# Patient Record
Sex: FEMALE
[De-identification: names, ages, dates, MRNs, and addresses within clinical notes are randomized per-mention and may not be internally consistent; named-entity substitution may affect disease eponyms.]

---

## 2017-07-19 ENCOUNTER — HOSPITAL ENCOUNTER (EMERGENCY)
Dept: HOSPITAL 42 - ED | Age: 27
Discharge: HOME | End: 2017-07-19
Payer: COMMERCIAL

## 2017-07-19 VITALS — BODY MASS INDEX: 22.2 KG/M2

## 2017-07-19 VITALS
DIASTOLIC BLOOD PRESSURE: 82 MMHG | RESPIRATION RATE: 19 BRPM | TEMPERATURE: 98.6 F | HEART RATE: 78 BPM | OXYGEN SATURATION: 100 % | SYSTOLIC BLOOD PRESSURE: 115 MMHG

## 2017-07-19 DIAGNOSIS — R25.2: Primary | ICD-10-CM

## 2017-07-19 NOTE — RAD
HISTORY:

back pain







COMPARISON:

No prior.



FINDINGS:



BONES:

Reversed S-shaped thoracolumbar scoliosis. No fracture. .  No lytic 

lesions 



DISC SPACES:

Normal.



SOFT TISSUES:

Normal.



OTHER FINDINGS:

None.



IMPRESSION:

Thoracolumbar scoliosis.  No fracture or lytic lesion

## 2017-07-19 NOTE — RAD
PROCEDURE:  Radiographs of the Lumbar Spine.



HISTORY:

back pain







COMPARISON:

No prior.



FINDINGS:



BONES:

Rightward lumbar convexity. No listhesis. No fracture.



DISC SPACES:

Unremarkable.



OTHER FINDINGS:

The pelvic surgical clips.  Left hemipelvic 2 mm phleboliths. 

Extensive stool retention 



IMPRESSION:

No fracture or subluxation.  Rightward lumbar convexity position on 

an or scoliosis.



Extensive stool retention-.

## 2017-07-19 NOTE — RAD
PROCEDURE:  Cervical Spine Radiographs.



HISTORY:

Pain. 



COMPARISON:

None. 



FINDINGS:



BONES:

Mild reversed cervical lordosis. No fracture.  Dens Intact. 



DISC SPACES:

Normal. 



SOFT TISSUES:

Normal. No prevertebral soft tissue swelling. 



OTHER FINDINGS:

None.



IMPRESSION:

Mild reversed cervical lordosis

## 2017-07-19 NOTE — ED PDOC
Arrival/HPI





<Enrike Hawley - Last Filed: 07/19/17 14:31>





<GeniaNerissa - Last Filed: 07/19/17 14:37>





- General


Chief Complaint: Back Pain


Time Seen by Provider: 07/19/17 12:49





- History of Present Illness


Narrative History of Present Illness (Text): 





07/19/17 13:13


This patient is a 28yo F w/ no PMhx, previous 2 C-Sections, who is coming into 

the hospital for intense cervical and lumbar back pain. She states that she is 

a  in [facility] and has to lift very heavy boxes for 10-12 hours 

a day and has been extremely understaffed recently. States she has taken 

nothing for the pain. States the pain shoots down both of her arms from her neck

, and she is unable to flex her neck down without extreme pain. Pain also 

shoots down lumbar spine bilaterally to the knees, not to the feet. Is able to 

ambulate and carry out daily activities (ie job) but with significant 

discomfort. She denies any saddle parasthesia, loss of control of bladder/

bowels. She denies fevers/chills, HA, CP, SOB, abdominal pain, n/V/D, dysuria/

freq/urg, or lower extremity swelling. 





PMD: Dr. Rafia Guadarrama


Allergies: PCN


Surgeries: C-Sections


FamHx: Denies


Meds: Denies


Social: working, denies EtOH, current smoking (former smoker), or illicit drug 

use


 (Enrike Hawley)





Past Medical History





- Provider Review


Nursing Documentation Reviewed: Yes





- Travel History


Have you recently traveled outside [place] w/in the past 3 mons?: No





- Past History


Past History: No Previous





- Infectious Disease


Hx of Infectious Diseases: None





- Tetanus Immunization


Tetanus Immunization: Unknown





- Cardiac


Hx Cardiac Disorders: No





- Pulmonary


Hx Respiratory Disorders: No





- Neurological


Hx Neurological Disorder: No





- HEENT


Hx HEENT Disorder: No





- Renal


Hx Renal Disorder: No





- Endocrine/Metabolic


Hx Endocrine Disorders: No





- Hematological/Oncological


Hx Blood Disorders: No





- Integumentary


Hx Dermatological Disorder: No





- Musculoskeletal/Rheumatological


Hx Musculoskeletal Disorders: Yes


Hx Back Pain: Yes


Other/Comment: b/l KNEE PAIN





- Gastrointestinal


Hx Gastrointestinal Disorders: Yes


Hx Gastroesophageal Reflux: Yes





- Genitourinary/Gynecological


Hx Genitourinary Disorders: No





- Psychiatric


Hx Psychophysiologic Disorder: No


Hx Substance Use: No





- Surgical History


Hx Appendectomy: Yes





- Anesthesia


Hx Anesthesia: Yes


Hx Anesthesia Reactions: No


Hx Malignant Hyperthermia: No





- Suicidal Assessment


Feels Threatened In Home Enviroment: No





<HONORIOTamikocarolynEnrike - Last Filed: 07/19/17 14:31>





Family/Social History





- Physician Review


Nursing Documentation Reviewed: Yes


Family/Social History: No Known Family HX


Smoking Status: Current Some Days Smoker


Hx Alcohol Use: Yes


Frequency of alcohol use: Socially


Hx Substance Use: No





<Enrike Hawley - Last Filed: 07/19/17 14:31>





Allergies/Home Meds





<Enrike Hawley - Last Filed: 07/19/17 14:31>





<Nerissa Cormier - Last Filed: 07/19/17 14:37>


Allergies/Adverse Reactions: 


Allergies





Penicillins Allergy (Verified 07/19/17 12:53)


 ANAPHYLAXIS











Review of Systems





- Review of Systems


Constitutional: absent: Fatigue, Weight Change


Eyes: absent: Vision Changes, Photophobia


ENT: absent: Hearing Changes


Respiratory: absent: SOB, Cough


Cardiovascular: absent: Chest Pain


Gastrointestinal: absent: Abdominal Pain, Stool Changes, Constipation, Diarrhea

, Nausea, Vomiting, Appetite Changes, Hematochezia


Genitourinary Female: absent: Dysuria, Frequency, Hematuria


Musculoskeletal: Back Pain, Neck Pain.  absent: Joint Swelling, Myalgias


Skin: absent: Rash, Pruritis


Neurological: absent: Headache, Dizziness


Endocrine: absent: Diaphoresis, Polyuria


Hemo/Lymphatic: absent: Adenopathy


Psychiatric: absent: Anxiety, Depression





<Enrike Hawley - Last Filed: 07/19/17 14:31>





Physical Exam


Temperature: Afebrile


Blood Pressure: Normal


Pulse: Regular


Respiratory Rate: Normal


Appearance: Positive for: Well-Appearing


Pain Distress: Mild


Mental Status: Positive for: Alert and Oriented X 3





- Systems Exam


Head: Present: Atraumatic, Normocephalic


Pupils: Present: PERRL


Extroacular Muscles: Present: EOMI


Conjunctiva: Present: Normal


Mouth: Present: Moist Mucous Membranes


Neck: Present: Normal Range of Motion, Other (ROM in cervical spine decreased, 

Spurling + b/l on both sides, intact muscle strength).  No: Meningeal Signs


Respiratory/Chest: Present: Clear to Auscultation, Good Air Exchange.  No: 

Respiratory Distress


Cardiovascular: Present: Regular Rate and Rhythm, Normal S1, S2.  No: Murmurs


Abdomen: Present: Normal Bowel Sounds.  No: Tenderness, Distention


Upper Extremity: Present: Normal Inspection, NORMAL PULSES.  No: Cyanosis, Edema

, Normal ROM (ROM decreased due to pain, intact muscle strength b/l on both 

sides, good  strength, intact AIN and PIN), Tenderness, Swelling


Neurological: Present: GCS=15, CN II-XII Intact, Speech Normal, Gait Normal


Skin: Present: Warm


Psychiatric: Present: Alert, Oriented x 3





<Enrike Hawley - Last Filed: 07/19/17 14:31>


Vital Signs Reviewed: Yes





<Nerissa Cormier - Last Filed: 07/19/17 14:37>


Vital Signs











  Temp Pulse Resp BP Pulse Ox


 


 07/19/17 12:56  98.6 F  78  19  115/82  100














Medical Decision Making





<Enrike Hawley - Last Filed: 07/19/17 14:31>





<Nerissa Cormier - Last Filed: 07/19/17 14:37>


ED Course and Treatment: 





07/19/17 13:18


Preg test done in ER; negative


Ordered Cervical, Thoracic and Lumbar X-Rays; no previous imaging on file and 

has been here for back pain in the past


requesting pill instead of toradol; giving 10mg Baclofen and 800mg of Ibuprofen





Disposition and reassess 





07/19/17 14:15


X-Rays show no signs of fracture, good disc space, lumbar and cervical spine 

show mild scoliosis, cervical spine flattening consistent with muscle spasm 


The patient is stable for discharge





Dr. Jim Epperson physiatry; patient given this doctors information for follow up


Naproxen 500mg BID, Baclofen 10mg TID, and Tramadol 50mg BID number 10; told to 

only take tramadol if pain is completely unbearable 


case discussed with Dr. Cormier 


07/19/17 14:33


 (Enrike Hawley)








07/19/17 14:36


Patient seen and examined.  X-ray as noted are unremarkable.  Will d/c on meds 

as noted. (Nerissa Cormier)





- Lab Interpretations


Lab Results: 


 Lab Results





07/19/17 13:19: Urine HCG, Qual Negative











- RAD Interpretation


Radiology Orders: 








07/19/17 13:10


CERVICAL SPINE AP & LATERAL [RAD] Stat 


DORSAL (THORACIC) SPINE [RAD] Stat 





07/19/17 13:12


LS SPINE AP/LAT [RAD] Stat 














- Medication Orders


Current Medication Orders: 











Discontinued Medications





Baclofen (Lioresal)  10 mg PO ONCE STA


   Stop: 07/19/17 13:10


   Last Admin: 07/19/17 14:06  Dose: 10 mg





Ibuprofen (Motrin Tab)  800 mg PO STAT STA


   Stop: 07/19/17 13:10


   Last Admin: 07/19/17 13:19  Dose: 800 mg











- PA / NP / Resident Statement


MD/ has reviewed & agrees with the documentation as recorded.


MD/ has examined the patient and agrees with the treatment plan.





<Nerissa Cormier - Last Filed: 07/19/17 14:37>





Disposition/Present on Arrival





- Present on Arrival


Any Indicators Present on Arrival: No


History of DVT/PE: No


History of Uncontrolled Diabetes: No


Urinary Catheter: No


History of Decub. Ulcer: No


History Surgical Site Infection Following: None





- Disposition


Have Diagnosis and Disposition been Completed?: Yes


Disposition Time: 14:19


Patient Plan: Discharge





<Enrike Hawley - Last Filed: 07/19/17 14:31>





<Nerissa Cormier - Last Filed: 07/19/17 14:37>





- Disposition


Diagnosis: 


 Muscle spasm





Disposition: HOME/ ROUTINE


Patient Problems: 


 Current Active Problems











Problem Status Onset


 


Muscle spasm Acute  











Condition: FAIR


Additional Instructions: 


Take the naprosyn and baclofen as prescribed and tramadol if pain is severe and 

uncontrolled.  Follow up with physiatry.  Return to the emergency department if 

any new concerning symptoms.


Prescriptions: 


Baclofen [Lioresal] 10 mg PO TID PRN #30 tab


 PRN Reason: Muscle Spasm


Naproxen 500 mg PO BID PRN #30 tab


 PRN Reason: Pain, Mild (1-3)


traMADol [Ultram] 50 mg PO BID PRN #10 tab


 PRN Reason: Pain, Moderate (4-7)


Referrals: 


Rafia Guadarrama MD [Primary Care Provider] - Follow up with primary


Art Epperson MD [Staff Provider] - Follow up with primary

## 2017-09-15 ENCOUNTER — HOSPITAL ENCOUNTER (EMERGENCY)
Dept: HOSPITAL 42 - ED | Age: 27
Discharge: HOME | End: 2017-09-15
Payer: COMMERCIAL

## 2017-09-15 VITALS — SYSTOLIC BLOOD PRESSURE: 119 MMHG | HEART RATE: 71 BPM | DIASTOLIC BLOOD PRESSURE: 71 MMHG

## 2017-09-15 VITALS — TEMPERATURE: 99.2 F

## 2017-09-15 VITALS — BODY MASS INDEX: 22.1 KG/M2

## 2017-09-15 VITALS — RESPIRATION RATE: 18 BRPM

## 2017-09-15 VITALS — OXYGEN SATURATION: 100 %

## 2017-09-15 DIAGNOSIS — M54.9: ICD-10-CM

## 2017-09-15 DIAGNOSIS — N83.202: Primary | ICD-10-CM

## 2017-09-15 DIAGNOSIS — R10.9: ICD-10-CM

## 2017-09-15 LAB
ALBUMIN/GLOB SERPL: 1.5 {RATIO} (ref 1.1–1.8)
ALP SERPL-CCNC: 76 U/L (ref 38–126)
ALT SERPL-CCNC: 30 U/L (ref 7–56)
APPEARANCE UR: CLEAR
AST SERPL-CCNC: 29 U/L (ref 14–36)
BACTERIA #/AREA URNS HPF: (no result) /[HPF]
BASE EXCESS BLDV CALC-SCNC: 0.4 MMOL/L (ref 0–2)
BASOPHILS # BLD AUTO: 0.01 K/MM3 (ref 0–2)
BASOPHILS NFR BLD: 0.1 % (ref 0–3)
BILIRUB SERPL-MCNC: 1.4 MG/DL (ref 0.2–1.3)
BILIRUB UR-MCNC: NEGATIVE MG/DL
BUN SERPL-MCNC: 16 MG/DL (ref 7–21)
CALCIUM SERPL-MCNC: 9.8 MG/DL (ref 8.4–10.5)
CHLORIDE SERPL-SCNC: 102 MMOL/L (ref 98–107)
CO2 SERPL-SCNC: 22 MMOL/L (ref 21–33)
COLOR UR: YELLOW
EOSINOPHIL # BLD: 0.1 10*3/UL (ref 0–0.7)
EOSINOPHIL NFR BLD: 1.5 % (ref 1.5–5)
ERYTHROCYTE [DISTWIDTH] IN BLOOD BY AUTOMATED COUNT: 12.7 % (ref 11.5–14.5)
GLOBULIN SER-MCNC: 3.2 GM/DL
GLUCOSE SERPL-MCNC: 97 MG/DL (ref 70–110)
GLUCOSE UR STRIP-MCNC: NEGATIVE MG/DL
GRANULOCYTES # BLD: 6.68 10*3/UL (ref 1.4–6.5)
GRANULOCYTES NFR BLD: 70.2 % (ref 50–68)
HCT VFR BLD CALC: 43.8 % (ref 36–48)
KETONES UR STRIP-MCNC: 15 MG/DL
LEUKOCYTE ESTERASE UR-ACNC: NEGATIVE LEU/UL
LIPASE SERPL-CCNC: 48 U/L (ref 23–300)
LYMPHOCYTES # BLD: 2.1 10*3/UL (ref 1.2–3.4)
LYMPHOCYTES NFR BLD AUTO: 22.2 % (ref 22–35)
MCH RBC QN AUTO: 30.1 PG (ref 25–35)
MCHC RBC AUTO-ENTMCNC: 35.4 G/DL (ref 31–37)
MCV RBC AUTO: 85 FL (ref 80–105)
MONOCYTES # BLD AUTO: 0.6 10*3/UL (ref 0.1–0.6)
MONOCYTES NFR BLD: 6 % (ref 1–6)
PH BLDV: 7.48 [PH] (ref 7.32–7.43)
PH UR STRIP: 8.5 [PH] (ref 4.7–8)
PLATELET # BLD: 323 10^3/UL (ref 120–450)
PMV BLD AUTO: 9.5 FL (ref 7–11)
POTASSIUM SERPL-SCNC: 3.5 MMOL/L (ref 3.6–5)
PROT SERPL-MCNC: 8 G/DL (ref 5.8–8.3)
PROT UR STRIP-MCNC: 30 MG/DL
RBC # UR STRIP: (no result) /UL
RBC #/AREA URNS HPF: (no result) /HPF (ref 0–2)
SODIUM SERPL-SCNC: 140 MMOL/L (ref 132–148)
SP GR UR STRIP: 1.02 (ref 1–1.03)
UROBILINOGEN UR STRIP-ACNC: 4 E.U./DL
WBC # BLD AUTO: 9.5 10^3/UL (ref 4.5–11)
WBC #/AREA URNS HPF: (no result) /HPF (ref 0–6)

## 2017-09-15 PROCEDURE — 96375 TX/PRO/DX INJ NEW DRUG ADDON: CPT

## 2017-09-15 PROCEDURE — 76830 TRANSVAGINAL US NON-OB: CPT

## 2017-09-15 PROCEDURE — 96374 THER/PROPH/DIAG INJ IV PUSH: CPT

## 2017-09-15 PROCEDURE — 99285 EMERGENCY DEPT VISIT HI MDM: CPT

## 2017-09-15 PROCEDURE — 83690 ASSAY OF LIPASE: CPT

## 2017-09-15 PROCEDURE — 82803 BLOOD GASES ANY COMBINATION: CPT

## 2017-09-15 PROCEDURE — 71010: CPT

## 2017-09-15 PROCEDURE — 81001 URINALYSIS AUTO W/SCOPE: CPT

## 2017-09-15 PROCEDURE — 93005 ELECTROCARDIOGRAM TRACING: CPT

## 2017-09-15 PROCEDURE — 80053 COMPREHEN METABOLIC PANEL: CPT

## 2017-09-15 PROCEDURE — 85025 COMPLETE CBC W/AUTO DIFF WBC: CPT

## 2017-09-15 PROCEDURE — 74177 CT ABD & PELVIS W/CONTRAST: CPT

## 2017-09-15 PROCEDURE — 96361 HYDRATE IV INFUSION ADD-ON: CPT

## 2017-09-15 NOTE — CT
PROCEDURE:  CT Abdomen and Pelvis with contrast



HISTORY:

abd pain



COMPARISON:

None.



TECHNIQUE:

Contrast dose: 100 CC OF OMNIPAQUE 350



Radiation dose:



Total exam DLP = 276 mGy-cm.



This CT exam was performed using one or more of the following dose 

reduction techniques: Automated exposure control, adjustment of the 

mA and/or kV according to patient size, and/or use of iterative 

reconstruction technique.



FINDINGS:



LOWER THORAX:

Unremarkable. 



LIVER:

Unremarkable. No gross lesion or ductal dilatation. 



GALLBLADDER AND BILE DUCTS:

Unremarkable. 



PANCREAS:

Unremarkable. No gross lesion or ductal dilatation.



SPLEEN:

Unremarkable. 



ADRENALS:

Unremarkable. No mass. 



KIDNEYS AND URETERS:

Unremarkable. No hydronephrosis. No solid mass. 



VASCULATURE:

Unremarkable. No aortic aneurysm. 



BOWEL:

Unremarkable. No obstruction. No gross mural thickening. 



APPENDIX:

Normal appendix. 



PERITONEUM:

Unremarkable. No free fluid. No free air. 



LYMPH NODES:

Unremarkable. No enlarged lymph nodes. 



BLADDER:

Unremarkable. 



REPRODUCTIVE:

2 centimeter left ovarian cyst.. 



BONES:

No acute fracture. 



OTHER FINDINGS:

None.



IMPRESSION:

2 centimeter left ovarian cyst.

## 2017-09-15 NOTE — ED PDOC
Arrival/HPI





- General


Chief Complaint: Abdominal Pain


Time Seen by Provider: 09/15/17 13:02


Historian: Patient





- History of Present Illness


Narrative History of Present Illness (Text): 





09/15/17 14:03


27yr old female presents today with abdominal pain, back pain, nausea and 

vomiting. pt states nausea and vomiting started first last night and then 

patient developed lower abdominal pain. pt states pain is now worsening and 

radiating to right flank. denies diarrhea. c/o severe sharp constant pain in 

entire abdomen and back. denies fever/chills. no other complaints. 


Time/Duration: Other (1 day)


Symptom Onset: Gradual


Symptom Course: Worsening


Quality: Aching, Stabbing


Severity Level: 10





Past Medical History





- Provider Review


Nursing Documentation Reviewed: Yes





- Travel History


Have you recently traveled outside [place] w/in the past 3 mons?: No





- Past History


Past History: No Previous





- Infectious Disease


Hx of Infectious Diseases: None





- Tetanus Immunization


Tetanus Immunization: Unknown





- Cardiac


Hx Cardiac Disorders: No





- Pulmonary


Hx Respiratory Disorders: No


Hx Asthma: Yes





- Neurological


Hx Neurological Disorder: No





- HEENT


Hx HEENT Disorder: No





- Renal


Hx Renal Disorder: No





- Endocrine/Metabolic


Hx Endocrine Disorders: No





- Hematological/Oncological


Hx Blood Disorders: No





- Integumentary


Hx Dermatological Disorder: No





- Musculoskeletal/Rheumatological


Hx Musculoskeletal Disorders: Yes


Hx Back Pain: Yes


Other/Comment: b/l KNEE PAIN





- Gastrointestinal


Hx Gastrointestinal Disorders: Yes


Hx Gastroesophageal Reflux: Yes





- Genitourinary/Gynecological


Hx Genitourinary Disorders: No





- Psychiatric


Hx Psychophysiologic Disorder: No


Hx Substance Use: No





- Surgical History


Hx Appendectomy: Yes





- Anesthesia


Hx Anesthesia: Yes


Hx Anesthesia Reactions: No


Hx Malignant Hyperthermia: No





- Suicidal Assessment


Feels Threatened In Home Enviroment: No





Family/Social History





- Physician Review


Nursing Documentation Reviewed: Yes


Family/Social History: Unknown Family HX


Smoking Status: Current Some Days Smoker


Hx Alcohol Use: Yes


Hx Substance Use: No





Allergies/Home Meds


Allergies/Adverse Reactions: 


Allergies





Penicillins Allergy (Verified 07/19/17 12:53)


 ANAPHYLAXIS








Home Medications: 


 Home Meds











 Medication  Instructions  Recorded  Confirmed


 


Albuterol HFA [Ventolin HFA 90 2 inhaler INH PRN PRN 09/15/17 09/15/17





mcg/actuation (8 g)]   














Review of Systems





- Review of Systems


Constitutional: absent: Fatigue, Fevers


ENT: absent: Sore Throat


Respiratory: absent: SOB, Cough


Cardiovascular: absent: Chest Pain


Gastrointestinal: Abdominal Pain, Nausea, Vomiting.  absent: Diarrhea


Genitourinary Female: absent: Dysuria, Frequency, Hematuria, Vaginal Bleeding, 

Vaginal Discharge


Musculoskeletal: Back Pain.  absent: Arthralgias, Neck Pain


Skin: absent: Rash, Pruritis


Neurological: absent: Headache, Dizziness


Psychiatric: absent: Anxiety, Depression





Physical Exam


Vital Signs Reviewed: Yes


Vital Signs











  Temp Pulse Resp BP Pulse Ox


 


 09/15/17 19:09   71  18  119/71  100


 


 09/15/17 16:45   75  18  114/79  100


 


 09/15/17 15:29   86  18  116/84  100


 


 09/15/17 14:50   96 H  17  118/80  100


 


 09/15/17 12:51  99.2 F  98 H  18  116/84  99











Temperature: Afebrile


Blood Pressure: Normal


Pulse: Regular


Respiratory Rate: Normal


Appearance: Positive for: Well-Appearing, Non-Toxic, Uncomfortable


Pain Distress: Mild


Mental Status: Positive for: Alert and Oriented X 3





- Systems Exam


Head: Present: Atraumatic


Mouth: Present: Moist Mucous Membranes


Neck: Present: Normal Range of Motion


Respiratory/Chest: Present: Clear to Auscultation, Good Air Exchange.  No: 

Respiratory Distress, Accessory Muscle Use


Cardiovascular: Present: Tachycardic


Abdomen: Present: Tenderness (diffuse abdominal tenderness), Normal Bowel Sounds

, Guarding.  No: Distention, Peritoneal Signs


Back: Present: Normal Inspection, Paraspinal Tenderness (right sided paraspinal 

tenderness).  No: Midline Tenderness


Upper Extremity: Present: Normal ROM


Lower Extremity: Present: Normal ROM


Neurological: Present: GCS=15, Speech Normal


Skin: Present: Warm, Dry, Normal Color.  No: Rashes


Psychiatric: Present: Alert, Oriented x 3





Medical Decision Making


ED Course and Treatment: 





09/15/17 14:07


Patient is nontoxic well appearing with stable vital signs presenting with 

severe abdominal pain





CBC wnl


CMP wnl


Lipase wnl





Lactate; 1.4





Urinalysis: trace ketones





Ultrasound:FINDINGS:


UTERUS:


Measures 4.2 x 5.8 x 8 cm. Normal in size and appearance. No fibroid or other 

mass lesion seen.


ENDOMETRIUM:


Measures 3.0 mm in diameter. No ultrasound findings to suggest gestational sac, 

fluid, debris, mass or polyp or other pathologic process within the 

endometrium. 


CERVIX:


No cervical abnormality identified.


RIGHT OVARY:


Measures 1.7 x 2.6 x 2.9 cm. No solid mass. Normal flow. 


LEFT OVARY:


Measures 2.5 x 2.4 x 3.1 cm. No solid mass. Normal flow. Simple cyst 1.5 x 2.1 

cm.


FREE FLUID:


No significant free fluid noted.


OTHER FINDINGS:


None. 


IMPRESSION:


Simple cyst left adnexa, otherwise unremarkable study.








CAT scanFINDINGS:


LOWER THORAX:


Unremarkable. 


LIVER:


Unremarkable. No gross lesion or ductal dilatation. 


GALLBLADDER AND BILE DUCTS:


Unremarkable. 


PANCREAS:


Unremarkable. No gross lesion or ductal dilatation.


SPLEEN:


Unremarkable. 


ADRENALS:


Unremarkable. No mass. 


KIDNEYS AND URETERS:


Unremarkable. No hydronephrosis. No solid mass. 


VASCULATURE:


Unremarkable. No aortic aneurysm. 


BOWEL:


Unremarkable. No obstruction. No gross mural thickening. 


APPENDIX:


Normal appendix. 


PERITONEUM:


Unremarkable. No free fluid. No free air. 


LYMPH NODES:


Unremarkable. No enlarged lymph nodes. 


BLADDER:


Unremarkable. 


REPRODUCTIVE:


2 centimeter left ovarian cyst.. 


BONES:


No acute fracture. 


OTHER FINDINGS:


None.


IMPRESSION:


2 centimeter left ovarian cyst.











Patient reassessment: pt feeling better after medications; 











Discussed all results with patient in depth. advised f/u with pmd, GI and GYN 

within the next 2 days. advised immediate return if symptoms worsen,persist or 

if new symptoms develop.





Patient verbalizes understanding of discharge instructions and need for 

immediate followup.








all aspects of this case were discussed the attending of record. 











Impression: Abdominal pain, back pain, ovarian cyst


Motrin every 6 hours as needed for pain


Increase fluids


Follow-up the primary care physician within the next 2 days


Follow-up with a GI specialist within the next 2 days


Return immediately if symptoms worsen persist or if new concerning symptoms 

develop








- Lab Interpretations


Lab Results: 








 09/15/17 13:30 





 09/15/17 13:30 





 Lab Results





09/15/17 14:25: pO2 29 L, VBG pH 7.48 H, VBG pCO2 31.0 L, VBG HCO3 23.1, VBG 

Total CO2 24.1, VBG O2 Sat (Calc) 65.9 H, VBG Base Excess 0.4, VBG Potassium 4.4

, Glucose 94, Lactate 1.4, FiO2 21.0, Sodium 134.0, Chloride 104.0, Venous 

Blood Potassium 4.4


09/15/17 13:41: Urine Color Yellow, Urine Appearance Clear, Urine pH 8.5, Ur 

Specific Gravity 1.020, Urine Protein 30 H, Urine Glucose (UA) Negative, Urine 

Ketones 15 H, Urine Blood Trace-intact H, Urine Nitrate Negative, Urine 

Bilirubin Negative, Urine Urobilinogen 4.0 H, Ur Leukocyte Esterase Negative, 

Urine RBC 0 - 2, Urine WBC 0 - 2, Ur Epithelial Cells 4 - 5, Urine Bacteria 

Small


09/15/17 13:30: WBC 9.5  D, RBC 5.15, Hgb 15.5, Hct 43.8, MCV 85.0, MCH 30.1, 

MCHC 35.4, RDW 12.7, Plt Count 323, MPV 9.5, Gran % 70.2 H, Lymph % (Auto) 22.2

, Mono % (Auto) 6.0, Eos % (Auto) 1.5, Baso % (Auto) 0.1, Gran # 6.68 H, Lymph 

# 2.1, Mono # 0.6, Eos # 0.1, Baso # 0.01


09/15/17 13:30: Sodium 140, Potassium 3.5 L, Chloride 102, Carbon Dioxide 22, 

Anion Gap 20, BUN 16, Creatinine 0.8, Est GFR (African Amer) > 60, Est GFR (Non-

Af Amer) > 60, Random Glucose 97, Calcium 9.8, Total Bilirubin 1.4 H, AST 29, 

ALT 30, Alkaline Phosphatase 76, Total Protein 8.0, Albumin 4.8, Globulin 3.2, 

Albumin/Globulin Ratio 1.5, Lipase 48











- RAD Interpretation


Radiology Orders: 








09/15/17 13:37


CHEST PORTABLE [RAD] Stat 





09/15/17 13:47


ABD & PELVIS IV CONTRAST ONLY [CT] Stat 





09/15/17 17:46


TRANSVAGINAL [US] Stat 














- Medication Orders


Current Medication Orders: 











Discontinued Medications





Sodium Chloride (Sodium Chloride 0.9%)  1,000 mls @ 999 mls/hr IV .Q1H1M STA


   Stop: 09/15/17 14:18


   Last Admin: 09/15/17 14:01  Dose: 999 mls/hr





Iohexol (Omnipaque 300 100 Ml) Confirm Administered Dose 100 ml IJ .STK-MED ONE


   Stop: 09/15/17 14:48


Iohexol (Omnipaque 350 100 Ml) Confirm Administered Dose 350 mg .ROUTE .STK-MED 

ONE


   Stop: 09/15/17 14:59


Ketorolac Tromethamine (Toradol)  30 mg IVP STAT STA


   Stop: 09/15/17 13:38


   Last Admin: 09/15/17 13:52  Dose: 30 mg





Morphine Sulfate (Morphine)  4 mg IVP STAT STA


   Stop: 09/15/17 14:57


   Last Admin: 09/15/17 15:35  Dose: 4 mg





Ondansetron HCl (Zofran Inj)  4 mg IVP STAT STA


   Stop: 09/15/17 13:38


   Last Admin: 09/15/17 13:51  Dose: 4 mg











Disposition/Present on Arrival





- Present on Arrival


Any Indicators Present on Arrival: No


History of DVT/PE: No


History of Uncontrolled Diabetes: No


Urinary Catheter: No


History of Decub. Ulcer: No


History Surgical Site Infection Following: None





- Disposition


Have Diagnosis and Disposition been Completed?: Yes


Diagnosis: 


 Abdominal pain, Back pain, Ovarian cyst





Disposition: HOME/ ROUTINE


Disposition Time: 19:35


Patient Plan: Discharge


Condition: GOOD


Discharge Instructions (ExitCare):  Ovarian Cyst (ED), Acute Abdominal Pain (ED)

, Back Pain (ED)


Additional Instructions: 


Motrin every 6 hours as needed for pain


Increase fluids


Follow-up the primary care physician within the next 2 days


Follow-up with a GI specialist within the next 2 days


Return immediately if symptoms worsen persist or if new concerning symptoms 

develop


Prescriptions: 


Ibuprofen [Motrin] 600 mg PO Q6H PRN #20 tab


 PRN Reason: pain/fever reduction


Referrals: 


Rafia Guadarrama MD [Primary Care Provider] - Follow up with primary


Archie Rea [Medical Doctor] - Follow up with primary


Katelyn Gordon MD, MD [Medical Doctor] - Follow up with primary


Forms:  [employer] (English)

## 2017-09-15 NOTE — US
HISTORY:

Pelvic pain.  Currently on Depo-Provera 



COMPARISON:

None available.



TECHNIQUE:

Transvaginal only. Real -time technique with 2D, duplex and color 

Doppler



FINDINGS:



UTERUS:

Measures 4.2 x 5.8 x 8 cm. Normal in size and appearance. No fibroid 

or other mass lesion seen.



ENDOMETRIUM:

Measures 3.0 mm in diameter. No ultrasound findings to suggest 

gestational sac, fluid, debris, mass or polyp or other pathologic 

process within the endometrium. 



CERVIX:

No cervical abnormality identified.



RIGHT OVARY:

Measures 1.7 x 2.6 x 2.9 cm. No solid mass. Normal flow. 



LEFT OVARY:

Measures 2.5 x 2.4 x 3.1 cm. No solid mass. Normal flow. Simple cyst 

1.5 x 2.1 cm.



FREE FLUID:

No significant free fluid noted.



OTHER FINDINGS:

None. 



IMPRESSION:

Simple cyst left adnexa, otherwise unremarkable study.

## 2017-09-15 NOTE — RAD
HISTORY:

abd pain  



COMPARISON:

No prior. 



FINDINGS:



LUNGS:

No active pulmonary disease.



PLEURA:

No significant pleural effusion identified, no pneumothorax apparent.



CARDIOVASCULAR:

Normal.



OSSEOUS STRUCTURES:

No significant abnormalities.



VISUALIZED UPPER ABDOMEN:

Normal.



OTHER FINDINGS:

None.



IMPRESSION:

No active disease.

## 2017-09-16 NOTE — CARD
--------------- APPROVED REPORT --------------





EKG Measurement

Heart [id number]

[id number]

[id number]

[id number]

[id number]



<Conclusion>

Sinus rhythm with marked sinus arrhythmia

Otherwise normal ECG

## 2017-11-01 ENCOUNTER — HOSPITAL ENCOUNTER (EMERGENCY)
Dept: HOSPITAL 42 - ED | Age: 27
Discharge: HOME | End: 2017-11-01
Payer: COMMERCIAL

## 2017-11-01 VITALS — SYSTOLIC BLOOD PRESSURE: 126 MMHG | HEART RATE: 88 BPM | DIASTOLIC BLOOD PRESSURE: 76 MMHG | TEMPERATURE: 98.9 F

## 2017-11-01 VITALS — OXYGEN SATURATION: 98 % | RESPIRATION RATE: 20 BRPM

## 2017-11-01 VITALS — BODY MASS INDEX: 22.7 KG/M2

## 2017-11-01 DIAGNOSIS — F17.200: ICD-10-CM

## 2017-11-01 DIAGNOSIS — J45.901: Primary | ICD-10-CM

## 2017-11-01 DIAGNOSIS — Z88.0: ICD-10-CM

## 2017-11-01 DIAGNOSIS — J02.9: ICD-10-CM

## 2017-11-01 LAB
APPEARANCE UR: (no result)
BILIRUB UR-MCNC: NEGATIVE MG/DL
COLOR UR: YELLOW
GLUCOSE UR STRIP-MCNC: NEGATIVE MG/DL
KETONES UR STRIP-MCNC: NEGATIVE MG/DL
LEUKOCYTE ESTERASE UR-ACNC: NEGATIVE LEU/UL
PH UR STRIP: 8.5 [PH] (ref 4.7–8)
PROT UR STRIP-MCNC: NEGATIVE MG/DL
RBC # UR STRIP: NEGATIVE /UL
SP GR UR STRIP: 1.01 (ref 1–1.03)
UROBILINOGEN UR STRIP-ACNC: 0.2 E.U./DL

## 2017-11-01 NOTE — RAD
HISTORY:

cough/fever  



COMPARISON:

No prior.



TECHNIQUE:

Chest PA and lateral



FINDINGS:



LUNGS:

No active pulmonary disease.



PLEURA:

No significant pleural effusion identified. No pneumothorax apparent.



CARDIOVASCULAR:

Normal.



OSSEOUS STRUCTURES:

No significant abnormalities.



VISUALIZED UPPER ABDOMEN:

Normal.



OTHER FINDINGS:

None.



IMPRESSION:

No active disease.

## 2017-11-01 NOTE — ED PDOC
Arrival/HPI





- General


Time Seen by Provider: 11/01/17 11:13


Historian: Patient





- History of Present Illness


Narrative History of Present Illness (Text): 


11/01/17 11:14


A 27 year old female, whose past medical history includes asthma, presents to 

the emergency department complaining of productive cough for the past 5 days. 

Patient notes fatigue, nasal congestion and post-tussive chest discomfort. She 

reports a fever of 101 at home yesterday. pt states she has cough with green 

phelgm. pt c/o hearing rattling sounds with deep inspiration. pt c/o feeling as 

if she has a bladder infection. Patient denies any nausea, vomiting, abdominal 

pain, headache, dizziness or any other complaints. 





Time/Duration: Other (5 days)


Symptom Course: Unchanged


Quality: Other


Context: Home





Past Medical History





- Provider Review


Nursing Documentation Reviewed: Yes





- Past History


Past History: No Previous





- Infectious Disease


Hx of Infectious Diseases: None





- Tetanus Immunization


Tetanus Immunization: Unknown





- Cardiac


Hx Cardiac Disorders: No





- Pulmonary


Hx Respiratory Disorders: No


Hx Asthma: Yes





- Neurological


Hx Neurological Disorder: No





- HEENT


Hx HEENT Disorder: No





- Renal


Hx Renal Disorder: No





- Endocrine/Metabolic


Hx Endocrine Disorders: No





- Hematological/Oncological


Hx Blood Disorders: No





- Integumentary


Hx Dermatological Disorder: No





- Musculoskeletal/Rheumatological


Hx Musculoskeletal Disorders: Yes


Hx Back Pain: Yes





- Gastrointestinal


Hx Gastrointestinal Disorders: Yes


Hx Gastroesophageal Reflux: Yes





- Genitourinary/Gynecological


Hx Genitourinary Disorders: No





- Psychiatric


Hx Psychophysiologic Disorder: No


Hx Substance Use: No





- Surgical History


Hx Appendectomy: Yes





- Anesthesia


Hx Anesthesia: Yes


Hx Anesthesia Reactions: No


Hx Malignant Hyperthermia: No





- Suicidal Assessment


Feels Threatened In Home Enviroment: No





Family/Social History





- Physician Review


Nursing Documentation Reviewed: Yes


Family/Social History: No Known Family HX


Smoking Status: Current Some Days Smoker


Hx Alcohol Use: Yes


Hx Substance Use: No





Allergies/Home Meds


Allergies/Adverse Reactions: 


Allergies





Penicillins Allergy (Verified 07/19/17 12:53)


 ANAPHYLAXIS








Home Medications: 


 Home Meds











 Medication  Instructions  Recorded  Confirmed


 


RX: Albuterol HFA [Ventolin HFA 90 2 inhaler INH PRN PRN 09/15/17 11/01/17





mcg/actuation (8 g)]   














Review of Systems





- Physician Review


All systems were reviewed & negative as marked: Yes





- Review of Systems


Constitutional: Fatigue, Fevers


ENT: Sore Throat, Sinus Congestion


Respiratory: Cough, Sputum, Wheezing.  absent: SOB


Cardiovascular: Chest Pain (post-tussive).  absent: Palpitations


Gastrointestinal: absent: Abdominal Pain, Constipation, Diarrhea, Nausea, 

Vomiting, Appetite Changes


Genitourinary Female: Dysuria, Frequency.  absent: Hematuria


Musculoskeletal: absent: Arthralgias, Back Pain, Neck Pain


Skin: absent: Rash, Pruritis


Neurological: absent: Headache, Dizziness


Psychiatric: absent: Anxiety, Depression





Physical Exam


Vital Signs Reviewed: Yes


Vital Signs











  Temp Pulse Resp BP Pulse Ox


 


 11/01/17 11:25    20  


 


 11/01/17 11:05  99 F  95 H  20  131/73  98











Temperature: Afebrile


Blood Pressure: Normal


Pulse: Regular


Respiratory Rate: Normal


Appearance: Positive for: Well-Appearing, Non-Toxic, Comfortable


Pain Distress: None


Mental Status: Positive for: Alert and Oriented X 3





- Systems Exam


Head: Present: Atraumatic, Normocephalic


Conjunctiva: Present: Normal


Ears: Present: Normal, NORMAL TM, Normal Canal.  No: Erythema, TM Bulging, TM 

Perf


Mouth: Present: Moist Mucous Membranes, Normal Lips, Normal Tounge.  No: 

Drooling, Trismus


Pharnyx: Present: Normal.  No: ERYTHEMA, EXUDATE, TONSILS ENLARGED


Nose (External): Present: Atraumatic


Nose (Internal): Present: Engorged, Clear Mucous, Other (congestion).  No: No 

Active Bleeding, Septal Hematoma


Neck: Present: Normal Range of Motion, Trachea Midline.  No: Meningeal Signs, 

Lymphadenopathy


Respiratory/Chest: Present: Good Air Exchange, Wheezes (expiratory wheezing 

bilaterally), Rhonchi.  No: Respiratory Distress, Accessory Muscle Use


Cardiovascular: Present: Regular Rate and Rhythm, Normal S1, S2.  No: Murmurs


Abdomen: Present: Normal Bowel Sounds.  No: Tenderness, Distention, Peritoneal 

Signs


Back: Present: Normal Inspection.  No: CVA Tenderness


Upper Extremity: Present: Normal Inspection.  No: Cyanosis, Edema


Lower Extremity: Present: Normal Inspection.  No: Edema


Neurological: Present: GCS=15, Speech Normal, Gait Normal


Skin: Present: Warm, Dry, Normal Color.  No: Rashes


Psychiatric: Present: Alert, Oriented x 3





Medical Decision Making


ED Course and Treatment: 


11/01/17 11:14


A 27 year old female with a productive cough and congestion





Plan:


-- Chest xray: no infiltrate. reviewed by dr. Jensen.


-- Duoneb


-- ekg; normal sinus rhythm at 82 bpm normal axis normal intervals no ST 

elevations


-- ua: wnl


-- Reassess and disposition





Progress Notes:





11/01/17 12:42


pt reassessment; lungs CTA bilaterally. No wheezing rales or rhonchi.





Patient with a history of asthma will start the patient on prednisone


Zithromax given by mouth. 








discussed all results in depth with patient. 


Patient was advised to follow up with primary care physician within the next 2 

days. Patient was advised to use albuterol nebulizer as needed. Patient was 

advised take medications as prescribed and return immediately if symptoms 

worsen persist or if new concerning symptoms develop








Patient verbalizes understanding of discharge instructions and need for 

immediate followup.








all aspects of this case were discussed the attending of record.  











impression; cough, sore throat, asthma exacerbation


Motrin every 6 hours as needed for pain/fever reduction


Zithromax daily 4 days


Prednisone daily 4 days


Increase fluids


Follow-up with primary care physician within the next 2 days


Return immediately if symptoms worsen persist or if new concerning symptoms 

develop 





- Lab Interpretations


Lab Results: 


 Lab Results





11/01/17 11:40: Urine Color Yellow, Urine Appearance Sl cloudy, Urine pH 8.5, 

Ur Specific Gravity 1.015, Urine Protein Negative, Urine Glucose (UA) Negative, 

Urine Ketones Negative, Urine Blood Negative, Urine Nitrate Negative, Urine 

Bilirubin Negative, Urine Urobilinogen 0.2, Ur Leukocyte Esterase Negative











- RAD Interpretation


Radiology Orders: 








11/01/17 11:14


CHEST TWO VIEWS (PA/LAT) [RAD] Stat 














- Medication Orders


Current Medication Orders: 








Azithromycin (Zithromax)  500 mg PO STAT STA


   PRN Reason: Protocol


   Stop: 11/01/17 12:39


Prednisone (Prednisone Tab)  60 mg PO STAT ONE


   Stop: 11/01/17 12:39





Discontinued Medications





Albuterol/Ipratropium (Duoneb 3 Mg/0.5 Mg (3 Ml) Ud)  3 ml IH STAT STA


   Stop: 11/01/17 11:15


   Last Admin: 11/01/17 11:24  Dose: 3 ml











- Scribe Statement


The provider has reviewed the documentation as recorded by the Willieibglen Mason





Provider Scribe Attestation:


All medical record entries made by the Scribe were at my direction and 

personally dictated by me. I have reviewed the chart and agree that the record 

accurately reflects my personal performance of the history, physical exam, 

medical decision making, and the department course for this patient. I have 

also personally directed, reviewed, and agree with the discharge instructions 

and disposition.








Disposition/Present on Arrival





- Present on Arrival


Any Indicators Present on Arrival: No


History of DVT/PE: No


History of Uncontrolled Diabetes: No


Urinary Catheter: No


History of Decub. Ulcer: No


History Surgical Site Infection Following: None





- Disposition


Have Diagnosis and Disposition been Completed?: Yes


Diagnosis: 


 Asthma exacerbation, Cough, Sore throat





Disposition: HOME/ ROUTINE


Disposition Time: 12:45


Patient Plan: Discharge


Condition: GOOD


Discharge Instructions (ExitCare):  Asthma (ED), Acute Cough (ED)


Additional Instructions: 


Motrin every 6 hours as needed for pain/fever reduction


Zithromax daily 4 days


Prednisone daily 4 days


Use nebulizer 3 times daily as needed for cough


Increase fluids


Follow-up with primary care physician within the next 2 days


Return immediately if symptoms worsen persist or if new concerning symptoms 

develop 


Prescriptions: 


RX: Albuterol HFA [Ventolin HFA 90 mcg/actuation (8 g)] 2 puff IH [id number] PRN #

1 inhaler


 PRN Reason: Cough


RX: Albuterol 0.083% [Albuterol 0.083% Inhal Sol (2.5 mg/3 ml) UD] 1 vial IH 

TID PRN #1 packet


 PRN Reason: Cough


Azithromycin [Zithromax] 250 mg PO DAILY #4 tab


Ibuprofen [Motrin] 600 mg PO Q6H PRN #20 tab


 PRN Reason: pain/fever reduction


RX: predniSONE [predniSONE Tab] 3 tab PO DAILY #12 tab


Referrals: 


Devin Vieira MD [Staff Provider] - Follow up with primary


[facility]Outside] - Follow up with primary


Forms:  WORK NOTE

## 2017-11-01 NOTE — CARD
--------------- APPROVED REPORT --------------





EKG Measurement

Heart [id number]

VT [id number]

[id number]

[id number]

[id number]



<Conclusion>

Normal sinus rhythm

Lateral ST elevation, Consider early repolarization pattern

Borderline ECG

## 2018-07-02 ENCOUNTER — HOSPITAL ENCOUNTER (EMERGENCY)
Dept: HOSPITAL 42 - ED | Age: 28
Discharge: HOME | End: 2018-07-02
Payer: COMMERCIAL

## 2018-07-02 VITALS
HEART RATE: 85 BPM | RESPIRATION RATE: 19 BRPM | OXYGEN SATURATION: 99 % | SYSTOLIC BLOOD PRESSURE: 125 MMHG | TEMPERATURE: 98.6 F | DIASTOLIC BLOOD PRESSURE: 63 MMHG

## 2018-07-02 VITALS — BODY MASS INDEX: 22.7 KG/M2

## 2018-07-02 DIAGNOSIS — T65.891A: ICD-10-CM

## 2018-07-02 DIAGNOSIS — T24.201A: ICD-10-CM

## 2018-07-02 DIAGNOSIS — T24.211A: Primary | ICD-10-CM
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
## 2018-07-02 DIAGNOSIS — T24.601A: ICD-10-CM

## 2018-07-02 DIAGNOSIS — Z23: ICD-10-CM

## 2018-07-02 DIAGNOSIS — T24.611A: ICD-10-CM
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
## 2018-09-13 ENCOUNTER — HOSPITAL ENCOUNTER (EMERGENCY)
Dept: HOSPITAL 42 - ED | Age: 28
Discharge: HOME | End: 2018-09-13
Payer: COMMERCIAL

## 2018-09-13 VITALS — SYSTOLIC BLOOD PRESSURE: 115 MMHG | OXYGEN SATURATION: 99 % | DIASTOLIC BLOOD PRESSURE: 81 MMHG

## 2018-09-13 VITALS — BODY MASS INDEX: 21.2 KG/M2

## 2018-09-13 VITALS — HEART RATE: 75 BPM | RESPIRATION RATE: 18 BRPM | TEMPERATURE: 98.5 F

## 2018-09-13 DIAGNOSIS — K21.9: ICD-10-CM

## 2018-09-13 DIAGNOSIS — E86.0: Primary | ICD-10-CM

## 2018-09-13 LAB
ALBUMIN SERPL-MCNC: 4.5 G/DL (ref 3–4.8)
ALBUMIN/GLOB SERPL: 1.5 {RATIO} (ref 1.1–1.8)
ALT SERPL-CCNC: 22 U/L (ref 7–56)
AMORPH SED URNS QL MICRO: (no result)
APPEARANCE UR: CLEAR
AST SERPL-CCNC: 22 U/L (ref 14–36)
BACTERIA #/AREA URNS HPF: (no result) /[HPF]
BASOPHILS # BLD AUTO: 0.03 K/MM3 (ref 0–2)
BASOPHILS NFR BLD: 0.5 % (ref 0–3)
BILIRUB UR-MCNC: NEGATIVE MG/DL
BUN SERPL-MCNC: 20 MG/DL (ref 7–21)
CALCIUM SERPL-MCNC: 9.3 MG/DL (ref 8.4–10.5)
COLOR UR: YELLOW
EOSINOPHIL # BLD: 0.2 10*3/UL (ref 0–0.7)
EOSINOPHIL NFR BLD: 2.5 % (ref 1.5–5)
ERYTHROCYTE [DISTWIDTH] IN BLOOD BY AUTOMATED COUNT: 12.8 % (ref 11.5–14.5)
GFR NON-AFRICAN AMERICAN: > 60
GLUCOSE UR STRIP-MCNC: NEGATIVE MG/DL
GRANULOCYTES # BLD: 2.91 10*3/UL (ref 1.4–6.5)
GRANULOCYTES NFR BLD: 45.4 % (ref 50–68)
HGB BLD-MCNC: 13.4 G/DL (ref 12–16)
LEUKOCYTE ESTERASE UR-ACNC: NEGATIVE LEU/UL
LYMPHOCYTES # BLD: 2.8 10*3/UL (ref 1.2–3.4)
LYMPHOCYTES NFR BLD AUTO: 44 % (ref 22–35)
MCH RBC QN AUTO: 29.9 PG (ref 25–35)
MCHC RBC AUTO-ENTMCNC: 34.3 G/DL (ref 31–37)
MCV RBC AUTO: 87.3 FL (ref 80–105)
MONOCYTES # BLD AUTO: 0.5 10*3/UL (ref 0.1–0.6)
MONOCYTES NFR BLD: 7.6 % (ref 1–6)
PH UR STRIP: 7 [PH] (ref 4.7–8)
PLATELET # BLD: 291 10^3/UL (ref 120–450)
PMV BLD AUTO: 9.7 FL (ref 7–11)
PROT UR STRIP-MCNC: (no result) MG/DL
RBC # BLD AUTO: 4.48 10^6/UL (ref 3.5–6.1)
RBC # UR STRIP: NEGATIVE /UL
RBC #/AREA URNS HPF: (no result) /HPF (ref 0–2)
SP GR UR STRIP: 1.01 (ref 1–1.03)
UROBILINOGEN UR STRIP-ACNC: 1 E.U./DL
WBC # BLD AUTO: 6.4 10^3/UL (ref 4.5–11)

## 2018-09-13 PROCEDURE — 81001 URINALYSIS AUTO W/SCOPE: CPT

## 2018-09-13 PROCEDURE — 99284 EMERGENCY DEPT VISIT MOD MDM: CPT

## 2018-09-13 PROCEDURE — 96360 HYDRATION IV INFUSION INIT: CPT

## 2018-09-13 PROCEDURE — 85025 COMPLETE CBC W/AUTO DIFF WBC: CPT

## 2018-09-13 PROCEDURE — 87086 URINE CULTURE/COLONY COUNT: CPT

## 2018-09-13 PROCEDURE — 84702 CHORIONIC GONADOTROPIN TEST: CPT

## 2018-09-13 PROCEDURE — 82948 REAGENT STRIP/BLOOD GLUCOSE: CPT

## 2018-09-13 PROCEDURE — 80053 COMPREHEN METABOLIC PANEL: CPT

## 2018-09-13 NOTE — ED PDOC
Arrival/HPI





- General


Historian: Patient





- History of Present Illness


Time/Duration: < week (3 days)


Symptom Course: Worsening





<Monica Cifuentes - Last Filed: 18 14:03>





<Suhas Escobar - Last Filed: 18 14:06>





- General


Chief Complaint: Abdominal Pain


Time Seen by Provider: 18 11:18





- History of Present Illness


Narrative History of Present Illness (Text): 





18 13:44


27 y/o A1 female with PMH of GERD and asthma who presents to the ED c/o 

nausea and vomiting x 3 days. Pt has been unable to keep much food or water down

, vomiting approx 3 times daily. Pt also having intermittent mild suprapubic 

cramping that began with the vomiting. Associated symptoms include dry mouth, 

constipation, fatigue, urinary frequency, and breast tenderness. Pt suspects 

she might be pregnant, unsure of home pregnancy test results. LMP 08/15/18. Pt 

seen at [facility] 3 weeks ago for vomiting and diagnosed with low blood sugar. 

Currently uses tobacco and marijuana intermittently. Denies fever, chills, 

diarrhea, dysuria, hematuria, vaginal bleeding, vaginal discharge, SOB, 

rhinorrhea, sore throat, headache, dizziness, syncope. 





 (Monica Cifuentes)





Past Medical History





- Provider Review


Nursing Documentation Reviewed: Yes





- Past History


Past History: No Previous





- Infectious Disease


Hx of Infectious Diseases: None





- Tetanus Immunization


Tetanus Immunization: Unknown





- Reproductive


Currently Pregnant: Unknown





- Cardiac


Hx Cardiac Disorders: No





- Pulmonary


Hx Respiratory Disorders: Yes


Hx Asthma: Yes





- Neurological


Hx Neurological Disorder: No





- HEENT


Hx HEENT Disorder: No





- Renal


Hx Renal Disorder: No





- Endocrine/Metabolic


Hx Endocrine Disorders: No





- Hematological/Oncological


Hx Blood Disorders: No





- Integumentary


Hx Dermatological Disorder: No





- Musculoskeletal/Rheumatological


Hx Musculoskeletal Disorders: Yes


Hx Back Pain: Yes





- Gastrointestinal


Hx Gastrointestinal Disorders: Yes


Hx Gastroesophageal Reflux: Yes





- Genitourinary/Gynecological


Hx Genitourinary Disorders: No


Other/Comment: spontaneous , 2014





- Psychiatric


Hx Psychophysiologic Disorder: No


Hx Substance Use: Yes





- Surgical History


Hx Appendectomy: Yes





- Anesthesia


Hx Anesthesia: Yes


Hx Anesthesia Reactions: No


Hx Malignant Hyperthermia: No





- Suicidal Assessment


Feels Threatened In Home Enviroment: No





<Monica Cifuentes - Last Filed: 18 14:03>





Family/Social History





- Physician Review


Nursing Documentation Reviewed: Yes


Family/Social History: No Known Family HX


Smoking Status: Current Some Days Smoker


Hx Alcohol Use: Yes


Hx Substance Use: Yes


Substance used: marijuana


Route: Smoking/Inhalation





<Monica Cifuentes - Last Filed: 18 14:03>





Allergies/Home Meds





<Monica Cifuentes - Last Filed: 18 14:03>





<Suhas Escobar - Last Filed: 18 14:06>


Allergies/Adverse Reactions: 


Allergies





Penicillins Allergy (Verified 18 11:15)


 ANAPHYLAXIS








Home Medications: 


 Home Meds











 Medication  Instructions  Recorded  Confirmed


 


No Known Home Med  18














Review of Systems





- Physician Review


All systems were reviewed & negative as marked: Yes





- Review of Systems


Constitutional: Normal.  absent: Fevers


ENT: Normal.  absent: Sore Throat, Rhinorrhea, Sinus Congestion


Respiratory: Normal.  absent: SOB


Cardiovascular: Normal


Gastrointestinal: Abdominal Pain (Crampy, suprapubic), Constipation, Nausea, 

Vomiting, Anorexia.  absent: Diarrhea, Hematochezia


Genitourinary Female: Frequency.  absent: Dysuria, Hematuria, Vaginal Bleeding, 

Vaginal Discharge


Musculoskeletal: Normal


Skin: Normal


Neurological: Normal


Hemo/Lymphatic: absent: Adenopathy





<Monica Cifuentes - Last Filed: 18 14:03>





Physical Exam


Vital Signs Reviewed: Yes


Temperature: Afebrile


Blood Pressure: Normal


Pulse: Regular


Respiratory Rate: Normal


Appearance: Positive for: Well-Appearing, Non-Toxic, Comfortable


Pain Distress: None


Mental Status: Positive for: Alert and Oriented X 3





- Systems Exam


Conjunctiva: Present: Normal


Ears: Present: Normal


Mouth: Present: Dry, Normal Tounge, Normal Teeth.  No: Normal Lips (Dry, chapped

)


Pharnyx: Present: Normal.  No: ERYTHEMA, EXUDATE, TONSILS ENLARGED, 

Peritonsilar Swelling


Neck: Present: Normal Range of Motion


Respiratory/Chest: Present: Clear to Auscultation, Good Air Exchange.  No: 

Respiratory Distress, Accessory Muscle Use


Cardiovascular: Present: Regular Rate and Rhythm, Normal S1, S2, Peripheal 

Pulses Present.  No: Murmurs


Abdomen: Present: Tenderness (Mild tenderness, suprapubic), Normal Bowel 

Sounds.  No: Distention, Peritoneal Signs


Upper Extremity: Present: NORMAL PULSES, Capillary Refill < 2s


Lower Extremity: Present: NORMAL PULSES, Capillary Refill < 2 s


Neurological: Present: Gait Normal


Skin: Present: Warm, Dry, Normal Color.  No: Rashes


Lymphatic: No: Cervical Adenopathy


Psychiatric: Present: Alert, Oriented x 3, Normal Insight, Normal Concentration





<Monica Cifuentes - Last Filed: 18 14:03>





Vital Signs











  Temp Pulse Resp BP Pulse Ox


 


 18 11:01  98.5 F  75  18  112/79  100














Medical Decision Making


Re-evaluation Time: 13:00


Reassessment Condition: Improved





- Lab Interpretations


I have reviewed the lab results: Yes





<Monica Cifuentes - Last Filed: 18 14:03>





<Suhas Escobar - Last Filed: 18 14:06>


ED Course and Treatment: 


18 12:00


27 y/o A1 female with PMH of GERD and asthma who presents to the ED c/o 

nausea and vomiting x 3 days. Associated crampy suprapubic pain. Denies fevers, 

chills, diarrhea, SOB, rhinorrhea, sore throat, headache. Physical exam shows 

mild suprapubic tenderness and dry mouth/lips. 


Urine pregnancy test negative but has history of false negative pregnancy 

tests. 





Will order CBC, CMP, UA with culture, beta-hcg quant


Will give fluids





Case discussed with Dr. Escobar, who agrees with plan. 





18 13:00


Pt reassessed. 


Pt states she is feeling better with fluids. Denies nausea.





CBC: wnl


CMP: wnl


UA: trace protein attributed to dehydration, otherwise wnl


beta-hcg quant: slightly elevated, possible early pregnancy








18 13:27


Pt informed of lab results.


Positive beta-hcg quantitative result discussed. Pt advised that this could be 

early pregnancy. 








18 13:36





Impression: Dehydration





Plan:


Pt advised to stop all alcohol, tobacco, and drug use and to begin taking OTC 

prenatal gummy vitamins. 


Pt will followup with OBGYN in two days to repeat bloodwork. Lab results 

printed for pt to take home. 


Pt will followup with PMD as needed


Told to return for high fevers, intractable vomiting, severe abdominal pain, 

syncope


Pt understands and agrees with plan, feels comfortable with discharge home.


 (Monica Cifuentes)





- Lab Interpretations


Lab Results: 











 18 12:00 





 18 12:00 





 Lab Results





18 12:00: Beta HCG, Quant 11.27 H


18 12:00: Sodium 139, Potassium 4.0, Chloride 106, Carbon Dioxide 25, 

Anion Gap 13, BUN 20, Creatinine 0.9, Est GFR (African Amer) > 60, Est GFR (Non-

Af Amer) > 60, Random Glucose 86, Calcium 9.3, Total Bilirubin 0.7, AST 22, ALT 

22, Alkaline Phosphatase 43, Total Protein 7.3, Albumin 4.5, Globulin 2.9, 

Albumin/Globulin Ratio 1.5


18 12:00: Urine Color Yellow, Urine Appearance Clear, Urine pH 7.0, Ur 

Specific Gravity 1.015, Urine Protein Trace H, Urine Glucose (UA) Negative, 

Urine Ketones Negative, Urine Blood Negative, Urine Nitrate Negative, Urine 

Bilirubin Negative, Urine Urobilinogen 1.0 H, Ur Leukocyte Esterase Negative, 

Urine RBC 0 - 2, Urine WBC 1 - 3, Ur Epithelial Cells 10 - 12, Amorphous 

Sediment Few, Urine Bacteria Many, Urine Other Uyeast


18 12:00: WBC 6.4  D, RBC 4.48, Hgb 13.4  D, Hct 39.1, MCV 87.3, MCH 29.9

, MCHC 34.3, RDW 12.8, Plt Count 291, MPV 9.7, Gran % 45.4 L, Lymph % (Auto) 

44.0 H, Mono % (Auto) 7.6 H, Eos % (Auto) 2.5, Baso % (Auto) 0.5, Gran # 2.91, 

Lymph # (Auto) 2.8, Mono # (Auto) 0.5, Eos # (Auto) 0.2, Baso # (Auto) 0.03











- Medication Orders


Current Medication Orders: 














Discontinued Medications





Sodium Chloride (Sodium Chloride 0.9%)  1,000 mls @ 999 mls/hr IV .Q1H1M STA


   Stop: 18 12:55


   Last Admin: 18 12:07  Dose: 999 mls/hr





eMAR Start Stop


 Document     18 12:07  LA  (Rec: 18 12:08  LA  [id number])


     Intravenous Solution


      Start Date                                 18


      Start Time                                 12:07


      End Date                                   18


      End time                                   13:08


      Total Infusion Time                        61














- PA / NP / Resident Statement


MD/ has examined the patient and agrees with the treatment plan.





<Suhas Escobar - Last Filed: 18 14:06>





Disposition/Present on Arrival





- Present on Arrival


Any Indicators Present on Arrival: No


History of DVT/PE: No


History of Uncontrolled Diabetes: No


Urinary Catheter: No


History of Decub. Ulcer: No


History Surgical Site Infection Following: None





- Disposition


Have Diagnosis and Disposition been Completed?: Yes


Disposition Time: 13:00





<Monica Cifuentes - Last Filed: 18 14:03>





<Suhas Escobar - Last Filed: 18 14:06>





- Disposition


Diagnosis: 


 Dehydration





Disposition: HOME/ ROUTINE


Patient Problems: 


 Current Active Problems











Problem Status Onset


 


Dehydration Acute  











Condition: IMPROVED


Discharge Instructions (ExitCare):  Dehydration, Adult (DC)


Additional Instructions: 


Increase clear fluids in order to stay hydrated. 


Stop all tobacco, alcohol, and drug use.


Begin taking OTC Prenatal gummies


Followup with OBGYN in 2 days to repeat bloodwork. 


Followup with PMD as needed.


Return for intractable vomiting, heavy vaginal bleeding, severe abdominal pain


Referrals: 


Solis Esposito MD [Staff Provider] - Follow up with primary


Ashely Will MD [Medical Doctor] - Follow up with primary


 Service [Outside] - Follow up with primary


Forms:  [employer] (English)

## 2018-09-23 ENCOUNTER — HOSPITAL ENCOUNTER (EMERGENCY)
Dept: HOSPITAL 42 - ED | Age: 28
LOS: 1 days | Discharge: HOME | End: 2018-09-24
Payer: COMMERCIAL

## 2018-09-23 VITALS — BODY MASS INDEX: 21.2 KG/M2

## 2018-09-23 VITALS — RESPIRATION RATE: 18 BRPM

## 2018-09-23 DIAGNOSIS — O20.9: Primary | ICD-10-CM

## 2018-09-23 LAB
ALBUMIN SERPL-MCNC: 4.6 G/DL (ref 3–4.8)
ALBUMIN/GLOB SERPL: 1.5 {RATIO} (ref 1.1–1.8)
ALT SERPL-CCNC: 21 U/L (ref 7–56)
AST SERPL-CCNC: 21 U/L (ref 14–36)
BUN SERPL-MCNC: 15 MG/DL (ref 7–21)
CALCIUM SERPL-MCNC: 9.7 MG/DL (ref 8.4–10.5)
ERYTHROCYTE [DISTWIDTH] IN BLOOD BY AUTOMATED COUNT: 12.8 % (ref 11.5–14.5)
GFR NON-AFRICAN AMERICAN: > 60
HGB BLD-MCNC: 13.4 G/DL (ref 12–16)
MCH RBC QN AUTO: 30 PG (ref 25–35)
MCHC RBC AUTO-ENTMCNC: 34.3 G/DL (ref 31–37)
MCV RBC AUTO: 87.5 FL (ref 80–105)
PLATELET # BLD: 317 10^3/UL (ref 120–450)
PMV BLD AUTO: 9.7 FL (ref 7–11)
RBC # BLD AUTO: 4.47 10^6/UL (ref 3.5–6.1)
WBC # BLD AUTO: 8.6 10^3/UL (ref 4.5–11)

## 2018-09-23 NOTE — ED PDOC
Arrival/HPI





- General


Chief Complaint: Female Genitourinary


Time Seen by Provider: 18 21:52


Historian: Patient





- History of Present Illness


Narrative History of Present Illness (Text): 





18 22:20


28 year old female, whose past medical history includes GERD and asthma  

and 1 fetal demise, presents to the emergency department complaining of vaginal 

bleeding with cramping. Patient last menstrual period was the end of August. 

Patient was here last week for stomach virus, but told it was early pregnancy 

with mild elevation of Beta HCG. Patient denies any fever, chills, chest pain, 

shortness of breath, nausea, vomiting, diarrhea, urinary symptoms, back pain, 

neck pain, headache, dizziness, or any other complaints.  





PMD: Dr. Guadarrama








Symptom Onset: Gradual


Symptom Course: Unchanged


Activities at Onset: Light


Context: Home





Past Medical History





- Provider Review


Nursing Documentation Reviewed: Yes





- Past History


Past History: No Previous





- Infectious Disease


Hx of Infectious Diseases: None





- Tetanus Immunization


Tetanus Immunization: Unknown





- Cardiac


Hx Cardiac Disorders: No





- Pulmonary


Hx Respiratory Disorders: Yes


Hx Asthma: Yes





- Neurological


Hx Neurological Disorder: No





- HEENT


Hx HEENT Disorder: No





- Renal


Hx Renal Disorder: No





- Endocrine/Metabolic


Hx Endocrine Disorders: No





- Hematological/Oncological


Hx Blood Disorders: No





- Integumentary


Hx Dermatological Disorder: No





- Musculoskeletal/Rheumatological


Hx Musculoskeletal Disorders: Yes


Hx Back Pain: Yes





- Gastrointestinal


Hx Gastrointestinal Disorders: Yes


Hx Gastroesophageal Reflux: Yes





- Genitourinary/Gynecological


Hx Genitourinary Disorders: No


Other/Comment: spontaneous , 2014





- Psychiatric


Hx Psychophysiologic Disorder: No


Hx Substance Use: Yes





- Surgical History


Hx Appendectomy: Yes





- Anesthesia


Hx Anesthesia: Yes


Hx Anesthesia Reactions: No


Hx Malignant Hyperthermia: No





- Suicidal Assessment


Feels Threatened In Home Enviroment: No





Family/Social History





- Physician Review


Nursing Documentation Reviewed: Yes


Family/Social History: No Known Family HX


Smoking Status: Current Some Days Smoker


Hx Alcohol Use: Yes


Hx Substance Use: Yes


Substance used: marijuana





Allergies/Home Meds


Allergies/Adverse Reactions: 


Allergies





Penicillins Allergy (Verified 18 22:21)


 ANAPHYLAXIS








Home Medications: 


 Home Meds











 Medication  Instructions  Recorded  Confirmed


 


No Known Home Med  18














Review of Systems





- Physician Review


All systems were reviewed & negative as marked: Yes





- Review of Systems


Constitutional: absent: Fevers, Other (Chills)


Respiratory: absent: SOB


Cardiovascular: absent: Chest Pain


Gastrointestinal: Abdominal Pain (cramping).  absent: Diarrhea, Nausea, Vomiting


Genitourinary Female: Vaginal Bleeding.  absent: Dysuria, Frequency, Hematuria


Musculoskeletal: absent: Back Pain, Neck Pain


Neurological: absent: Headache, Dizziness





Physical Exam


Vital Signs Reviewed: Yes


Vital Signs











  Temp Pulse Resp BP Pulse Ox


 


 18 22:20  98.6 F  63  18  101/71  100











Temperature: Afebrile


Blood Pressure: Normal


Pulse: Regular


Respiratory Rate: Normal


Appearance: Positive for: Well-Appearing, Non-Toxic, Comfortable


Pain Distress: None


Mental Status: Positive for: Alert and Oriented X 3





- Systems Exam


Head: Present: Atraumatic, Normocephalic


Pupils: Present: PERRL


Extroacular Muscles: Present: EOMI


Conjunctiva: Present: Normal


Mouth: Present: Moist Mucous Membranes


Neck: Present: Normal Range of Motion


Respiratory/Chest: Present: Clear to Auscultation, Good Air Exchange.  No: 

Respiratory Distress, Accessory Muscle Use


Cardiovascular: Present: Regular Rate and Rhythm, Normal S1, S2.  No: Murmurs


Abdomen: No: Tenderness, Distention, Peritoneal Signs


Genitourinary/Pelvic Exam: Present: Vaginal Bleeding (Scanty amount of blood to 

the vaginal introitus), Cervical os Closed.  No: Cervical Motion Tendernes


Back: Present: Normal Inspection


Upper Extremity: Present: Normal Inspection.  No: Cyanosis, Edema


Lower Extremity: Present: Normal Inspection.  No: Edema


Neurological: Present: GCS=15, CN II-XII Intact, Speech Normal


Skin: Present: Warm, Dry, Normal Color.  No: Rashes


Psychiatric: Present: Alert, Oriented x 3, Normal Insight, Normal Concentration





Medical Decision Making


ED Course and Treatment: 





18 22:20


Impression:


28 year old female presents complaining of vaginal bleeding with cramping. 





Plan:


-- labs


-- Transvaginal US 


-- Reassess and disposition





Prior Visits:


Notes and results from previous visits were reviewed. Patient was last seen in 

the emergency department on 18 presents complaining of nausea and 

vomiting for the past 3 days. Patient was discharged. 





Progress Notes:





EXAM: US Pregnancy, Transvaginal


Dictated and Authenticated by: Truman Sweet MD


2018 11:59 PM 


IMPRESSION:


1. No intrauterine gestational sac is seen. Findings may reflect recent 

spontaneous AB or very early


intrauterine gestation. Ectopic pregnancy is not excluded although no adnexal 

abnormality is seen.


Serial beta hCG levels may be of benefit.


2. Otherwise negative pelvic echogram.





18 01:21


On re-evaluation, patient feels better here in the Emergency room The results 

were discussed with the patient at great length. Results revealed either early 

pregnancy or spontaneous . Patient scheduled to see GYN tomorrow and to 

follow up as instructed. Of course if any heavy bleeding or pain occurs, 

patient was instructed to return to Emergency department. 





- Lab Interpretations


Lab Results: 








 18 22:45 





 18 22:45 





 Lab Results





18 23:53: Blood Type Confirm O POSITIVE


18 23:26: Blood Type O POSITIVE, Antibody Screen Negative, BBK History 

Checked No verified bt


18 22:45: Beta HCG, Quant 35.25 H


18 22:45: WBC 8.6  D, RBC 4.47, Hgb 13.4, Hct 39.1, MCV 87.5, MCH 30.0, 

MCHC 34.3, RDW 12.8, Plt Count 317, MPV 9.7


18 22:45: Sodium 140, Potassium 3.5 L, Chloride 104, Carbon Dioxide 27, 

Anion Gap 12, BUN 15, Creatinine 0.9, Est GFR (African Amer) > 60, Est GFR (Non-

Af Amer) > 60, Random Glucose 94, Calcium 9.7, Total Bilirubin 0.2, AST 21, ALT 

21, Alkaline Phosphatase 56, Total Protein 7.6, Albumin 4.6, Globulin 3.1, 

Albumin/Globulin Ratio 1.5








I have reviewed the lab results: Yes





- RAD Interpretation


Radiology Orders: 








18 22:29


OB TRANSVAGINAL PREGNANCY [US] Stat 














- Scribe Statement


The provider has reviewed the documentation as recorded by the Francesco Anaya





Provider Scribe Attestation:


All medical record entries made by the Scribe were at my direction and 

personally dictated by me. I have reviewed the chart and agree that the record 

accurately reflects my personal performance of the history, physical exam, 

medical decision making, and the department course for this patient. I have 

also personally directed, reviewed, and agree with the discharge instructions 

and disposition.








Disposition/Present on Arrival





- Present on Arrival


Any Indicators Present on Arrival: No


History of DVT/PE: No


History of Uncontrolled Diabetes: No


Urinary Catheter: No


History of Decub. Ulcer: No


History Surgical Site Infection Following: None





- Disposition


Have Diagnosis and Disposition been Completed?: Yes


Diagnosis: 


 Vaginal bleeding affecting early pregnancy





Disposition: HOME/ ROUTINE


Disposition Time: 01:19


Patient Plan: Discharge


Patient Problems: 


 Current Active Problems











Problem Status Onset


 


Vaginal bleeding affecting early pregnancy Acute  











Condition: GOOD


Discharge Instructions (ExitCare):  Bleeding With Pregnancy (DC)


Additional Instructions: 


Rest/no strenuous physical activity/follow up with your gynecologist tommorow 

as scheduled


Referrals: 


Rafia Guadarrama MD [Primary Care Provider] - Follow up with primary


Forms:  [employer] (English)

## 2018-09-24 VITALS
DIASTOLIC BLOOD PRESSURE: 64 MMHG | HEART RATE: 74 BPM | SYSTOLIC BLOOD PRESSURE: 113 MMHG | TEMPERATURE: 98 F | OXYGEN SATURATION: 98 %

## 2018-09-24 NOTE — US
Date of service: 



09/23/2018



HISTORY:

Pain.  LMP 08/15/2018. 



COMPARISON:

None available.



TECHNIQUE:

Transvaginal only. Real -time technique with 2D, duplex and color 

Doppler



FINDINGS:



UTERUS:

Measures 4.8 x 6.3 x 9.1 cm. Normal in size and appearance. No 

fibroid or other mass lesion seen.



ENDOMETRIUM:

Measures 11.3 mm in diameter. No ultrasound findings to suggest 

gestational sac, fluid, debris, mass or polyp or other pathologic 

process within the endometrium. 



CERVIX:

No cervical abnormality identified.



RIGHT OVARY:

Measures 1.5 x 1.7 x 2.9 cm. No solid mass. Normal flow. 



LEFT OVARY:

Measures 2.1 x 3.1 x 2.3 cm. No solid mass. Normal flow. 



FREE FLUID:

No significant free fluid noted.



OTHER FINDINGS:

None. 



IMPRESSION:

Unremarkable pelvic ultrasound.  No visible products of conception 

either intrauterine or ectopic. 



________________________________________________



Concordant results (preliminary interpretation) provided by [facility].



Procedure Completed: 23:20



Preliminary ([facility]) Report: Dictated and Authenticated: 23:59



Final Interpretation: 08:32.  September 24, 2018.

## 2018-10-27 ENCOUNTER — HOSPITAL ENCOUNTER (EMERGENCY)
Dept: HOSPITAL 42 - ED | Age: 28
Discharge: HOME | End: 2018-10-27
Payer: COMMERCIAL

## 2018-10-27 VITALS — BODY MASS INDEX: 21.2 KG/M2

## 2018-10-27 VITALS — SYSTOLIC BLOOD PRESSURE: 126 MMHG | HEART RATE: 80 BPM | RESPIRATION RATE: 18 BRPM | DIASTOLIC BLOOD PRESSURE: 86 MMHG

## 2018-10-27 VITALS — OXYGEN SATURATION: 99 %

## 2018-10-27 VITALS — TEMPERATURE: 98.7 F

## 2018-10-27 DIAGNOSIS — J11.1: Primary | ICD-10-CM

## 2018-10-27 DIAGNOSIS — F17.210: ICD-10-CM

## 2018-10-27 LAB
ALBUMIN SERPL-MCNC: 4.6 G/DL (ref 3–4.8)
ALBUMIN/GLOB SERPL: 1.4 {RATIO} (ref 1.1–1.8)
ALT SERPL-CCNC: 20 U/L (ref 7–56)
AST SERPL-CCNC: 22 U/L (ref 14–36)
BASOPHILS # BLD AUTO: 0.04 K/MM3 (ref 0–2)
BASOPHILS NFR BLD: 0.5 % (ref 0–3)
BUN SERPL-MCNC: 12 MG/DL (ref 7–21)
CALCIUM SERPL-MCNC: 9.5 MG/DL (ref 8.4–10.5)
EOSINOPHIL # BLD: 0.3 10*3/UL (ref 0–0.7)
EOSINOPHIL NFR BLD: 4.1 % (ref 1.5–5)
ERYTHROCYTE [DISTWIDTH] IN BLOOD BY AUTOMATED COUNT: 12.3 % (ref 11.5–14.5)
GFR NON-AFRICAN AMERICAN: > 60
GRANULOCYTES # BLD: 3.43 10*3/UL (ref 1.4–6.5)
GRANULOCYTES NFR BLD: 45.8 % (ref 50–68)
HGB BLD-MCNC: 14.4 G/DL (ref 12–16)
LYMPHOCYTES # BLD: 3.3 10*3/UL (ref 1.2–3.4)
LYMPHOCYTES NFR BLD AUTO: 43.6 % (ref 22–35)
MCH RBC QN AUTO: 30.2 PG (ref 25–35)
MCHC RBC AUTO-ENTMCNC: 34.3 G/DL (ref 31–37)
MCV RBC AUTO: 88.1 FL (ref 80–105)
MONOCYTES # BLD AUTO: 0.5 10*3/UL (ref 0.1–0.6)
MONOCYTES NFR BLD: 6 % (ref 1–6)
PLATELET # BLD: 309 10^3/UL (ref 120–450)
PMV BLD AUTO: 10 FL (ref 7–11)
RBC # BLD AUTO: 4.77 10^6/UL (ref 3.5–6.1)
WBC # BLD AUTO: 7.5 10^3/UL (ref 4.5–11)

## 2018-10-27 PROCEDURE — 71045 X-RAY EXAM CHEST 1 VIEW: CPT

## 2018-10-27 PROCEDURE — 96360 HYDRATION IV INFUSION INIT: CPT

## 2018-10-27 PROCEDURE — 99285 EMERGENCY DEPT VISIT HI MDM: CPT

## 2018-10-27 PROCEDURE — 80053 COMPREHEN METABOLIC PANEL: CPT

## 2018-10-27 PROCEDURE — 85025 COMPLETE CBC W/AUTO DIFF WBC: CPT

## 2018-10-27 PROCEDURE — 87804 INFLUENZA ASSAY W/OPTIC: CPT

## 2018-10-27 NOTE — RAD
Date of service: 



10/27/2018



HISTORY:

 medical clearance 



COMPARISON:

11/01/2027 



FINDINGS:



LUNGS:

The lungs are well inflated and clear.



PLEURA:

No pleural effusions or pneumothorax. 



CARDIOVASCULAR:

The heart is normal in size.  No aortic atherosclerotic calcification 

present. 



OSSEOUS STRUCTURES:

Within normal limits for the patient's age.



VISUALIZED UPPER ABDOMEN:

Normal.



OTHER FINDINGS:

None.



IMPRESSION:

No active pulmonary disease.

## 2019-02-18 ENCOUNTER — EMERGENCY (EMERGENCY)
Facility: HOSPITAL | Age: 29
LOS: 0 days | Discharge: HOME | End: 2019-02-18
Attending: EMERGENCY MEDICINE | Admitting: EMERGENCY MEDICINE

## 2019-02-18 VITALS
OXYGEN SATURATION: 98 % | RESPIRATION RATE: 20 BRPM | HEART RATE: 71 BPM | SYSTOLIC BLOOD PRESSURE: 122 MMHG | TEMPERATURE: 98 F | DIASTOLIC BLOOD PRESSURE: 59 MMHG

## 2019-02-18 DIAGNOSIS — Z90.49 ACQUIRED ABSENCE OF OTHER SPECIFIED PARTS OF DIGESTIVE TRACT: ICD-10-CM

## 2019-02-18 DIAGNOSIS — R10.84 GENERALIZED ABDOMINAL PAIN: ICD-10-CM

## 2019-02-18 DIAGNOSIS — Z88.0 ALLERGY STATUS TO PENICILLIN: ICD-10-CM

## 2019-02-18 DIAGNOSIS — K21.9 GASTRO-ESOPHAGEAL REFLUX DISEASE WITHOUT ESOPHAGITIS: ICD-10-CM

## 2019-02-18 DIAGNOSIS — R11.2 NAUSEA WITH VOMITING, UNSPECIFIED: ICD-10-CM

## 2019-02-18 DIAGNOSIS — Z90.49 ACQUIRED ABSENCE OF OTHER SPECIFIED PARTS OF DIGESTIVE TRACT: Chronic | ICD-10-CM

## 2019-02-18 LAB
ALBUMIN SERPL ELPH-MCNC: 4.5 G/DL — SIGNIFICANT CHANGE UP (ref 3.5–5.2)
ALP SERPL-CCNC: 55 U/L — SIGNIFICANT CHANGE UP (ref 30–115)
ALT FLD-CCNC: 13 U/L — SIGNIFICANT CHANGE UP (ref 0–41)
ANION GAP SERPL CALC-SCNC: 11 MMOL/L — SIGNIFICANT CHANGE UP (ref 7–14)
APPEARANCE UR: CLEAR — SIGNIFICANT CHANGE UP
AST SERPL-CCNC: 15 U/L — SIGNIFICANT CHANGE UP (ref 0–41)
BASOPHILS # BLD AUTO: 0.05 K/UL — SIGNIFICANT CHANGE UP (ref 0–0.2)
BASOPHILS NFR BLD AUTO: 0.7 % — SIGNIFICANT CHANGE UP (ref 0–1)
BILIRUB SERPL-MCNC: 0.4 MG/DL — SIGNIFICANT CHANGE UP (ref 0.2–1.2)
BILIRUB UR-MCNC: NEGATIVE — SIGNIFICANT CHANGE UP
BLD GP AB SCN SERPL QL: SIGNIFICANT CHANGE UP
BUN SERPL-MCNC: 19 MG/DL — SIGNIFICANT CHANGE UP (ref 10–20)
CALCIUM SERPL-MCNC: 9.5 MG/DL — SIGNIFICANT CHANGE UP (ref 8.5–10.1)
CHLORIDE SERPL-SCNC: 104 MMOL/L — SIGNIFICANT CHANGE UP (ref 98–110)
CO2 SERPL-SCNC: 25 MMOL/L — SIGNIFICANT CHANGE UP (ref 17–32)
COLOR SPEC: YELLOW — SIGNIFICANT CHANGE UP
CREAT SERPL-MCNC: 0.9 MG/DL — SIGNIFICANT CHANGE UP (ref 0.7–1.5)
DIFF PNL FLD: NEGATIVE — SIGNIFICANT CHANGE UP
EOSINOPHIL # BLD AUTO: 0.27 K/UL — SIGNIFICANT CHANGE UP (ref 0–0.7)
EOSINOPHIL NFR BLD AUTO: 3.7 % — SIGNIFICANT CHANGE UP (ref 0–8)
GLUCOSE SERPL-MCNC: 89 MG/DL — SIGNIFICANT CHANGE UP (ref 70–99)
GLUCOSE UR QL: NEGATIVE MG/DL — SIGNIFICANT CHANGE UP
HCT VFR BLD CALC: 41 % — SIGNIFICANT CHANGE UP (ref 37–47)
HGB BLD-MCNC: 13.8 G/DL — SIGNIFICANT CHANGE UP (ref 12–16)
IMM GRANULOCYTES NFR BLD AUTO: 0.1 % — SIGNIFICANT CHANGE UP (ref 0.1–0.3)
KETONES UR-MCNC: NEGATIVE — SIGNIFICANT CHANGE UP
LEUKOCYTE ESTERASE UR-ACNC: NEGATIVE — SIGNIFICANT CHANGE UP
LIDOCAIN IGE QN: 59 U/L — SIGNIFICANT CHANGE UP (ref 7–60)
LYMPHOCYTES # BLD AUTO: 3.73 K/UL — HIGH (ref 1.2–3.4)
LYMPHOCYTES # BLD AUTO: 51 % — SIGNIFICANT CHANGE UP (ref 20.5–51.1)
MCHC RBC-ENTMCNC: 29.7 PG — SIGNIFICANT CHANGE UP (ref 27–31)
MCHC RBC-ENTMCNC: 33.7 G/DL — SIGNIFICANT CHANGE UP (ref 32–37)
MCV RBC AUTO: 88.2 FL — SIGNIFICANT CHANGE UP (ref 81–99)
MONOCYTES # BLD AUTO: 0.61 K/UL — HIGH (ref 0.1–0.6)
MONOCYTES NFR BLD AUTO: 8.3 % — SIGNIFICANT CHANGE UP (ref 1.7–9.3)
NEUTROPHILS # BLD AUTO: 2.65 K/UL — SIGNIFICANT CHANGE UP (ref 1.4–6.5)
NEUTROPHILS NFR BLD AUTO: 36.2 % — LOW (ref 42.2–75.2)
NITRITE UR-MCNC: NEGATIVE — SIGNIFICANT CHANGE UP
NRBC # BLD: 0 /100 WBCS — SIGNIFICANT CHANGE UP (ref 0–0)
PH UR: 6 — SIGNIFICANT CHANGE UP (ref 5–8)
PLATELET # BLD AUTO: 310 K/UL — SIGNIFICANT CHANGE UP (ref 130–400)
POTASSIUM SERPL-MCNC: 4.2 MMOL/L — SIGNIFICANT CHANGE UP (ref 3.5–5)
POTASSIUM SERPL-SCNC: 4.2 MMOL/L — SIGNIFICANT CHANGE UP (ref 3.5–5)
PROT SERPL-MCNC: 6.9 G/DL — SIGNIFICANT CHANGE UP (ref 6–8)
PROT UR-MCNC: NEGATIVE MG/DL — SIGNIFICANT CHANGE UP
RBC # BLD: 4.65 M/UL — SIGNIFICANT CHANGE UP (ref 4.2–5.4)
RBC # FLD: 12.1 % — SIGNIFICANT CHANGE UP (ref 11.5–14.5)
SODIUM SERPL-SCNC: 140 MMOL/L — SIGNIFICANT CHANGE UP (ref 135–146)
SP GR SPEC: 1.02 — SIGNIFICANT CHANGE UP (ref 1.01–1.03)
TYPE + AB SCN PNL BLD: SIGNIFICANT CHANGE UP
UROBILINOGEN FLD QL: 1 MG/DL (ref 0.2–0.2)
WBC # BLD: 7.32 K/UL — SIGNIFICANT CHANGE UP (ref 4.8–10.8)
WBC # FLD AUTO: 7.32 K/UL — SIGNIFICANT CHANGE UP (ref 4.8–10.8)

## 2019-02-18 RX ORDER — IOHEXOL 300 MG/ML
30 INJECTION, SOLUTION INTRAVENOUS ONCE
Qty: 0 | Refills: 0 | Status: COMPLETED | OUTPATIENT
Start: 2019-02-18 | End: 2019-02-18

## 2019-02-18 RX ORDER — ONDANSETRON 8 MG/1
4 TABLET, FILM COATED ORAL ONCE
Qty: 0 | Refills: 0 | Status: COMPLETED | OUTPATIENT
Start: 2019-02-18 | End: 2019-02-18

## 2019-02-18 RX ORDER — SODIUM CHLORIDE 9 MG/ML
1000 INJECTION INTRAMUSCULAR; INTRAVENOUS; SUBCUTANEOUS ONCE
Qty: 0 | Refills: 0 | Status: COMPLETED | OUTPATIENT
Start: 2019-02-18 | End: 2019-02-18

## 2019-02-18 RX ADMIN — Medication 30 MILLILITER(S): at 19:24

## 2019-02-18 RX ADMIN — ONDANSETRON 4 MILLIGRAM(S): 8 TABLET, FILM COATED ORAL at 19:29

## 2019-02-18 RX ADMIN — SODIUM CHLORIDE 2000 MILLILITER(S): 9 INJECTION INTRAMUSCULAR; INTRAVENOUS; SUBCUTANEOUS at 19:24

## 2019-02-18 RX ADMIN — IOHEXOL 30 MILLILITER(S): 300 INJECTION, SOLUTION INTRAVENOUS at 19:24

## 2019-02-18 NOTE — ED ADULT NURSE NOTE - NSSISCREENINGQ5_ED_A_ED
Instructions for Birth Control Pill Use      The birth control pill works primarily by blocking ovulation (release of an egg). If there is no egg to meet the sperm, pregnancy cannot occur.   The pill also works by making cervical mucous thick and unrecepti time every day, like brushing your teeth in the morning, eating a meal or going to bed. Establishing a routine will make it easier for you to remember. The pill works best if you take it about the same time every day.     Continuing on the pills ~ What if…… pills at once. If you are in the third week of pills, finish the pack and skip the inactive pills and start a new pack. Start your backup method of birth control immediately.   You are at risk for becoming pregnant if you do not use a backup contraception No

## 2019-02-18 NOTE — ED PROVIDER NOTE - PHYSICAL EXAMINATION
CONSTITUTIONAL: Well-developed; well-nourished; in no acute distress, speaking in full sentences  SKIN: warm, dry  HEAD: Normocephalic; atraumatic  EYES: PERRL, EOMI, no conjunctival erythema  ENT: No nasal discharge; airway clear, mucous membranes moist  NECK: Supple; non tender, FROM  CARD: +S1, S2 no murmurs, gallops, or rubs. Regular rate and rhythm. radial 2+  RESP: No wheezes, rales or rhonchi. CTABL  ABD: soft diffusely mildly tender, no rebound, no guarding, no rigidity, neg murphys  EXT: moves all extremities, ambulates wo assistance No clubbing, cyanosis or edema.   NEURO: Alert, oriented, grossly unremarkable, no focal deficits, cn ii-xii grossly intact, gait steady  PSYCH: Cooperative, appropriate  rectal exam done w chaperone present: no stool in the vault

## 2019-02-18 NOTE — ED PROVIDER NOTE - NS ED ROS FT
General: +fevers, nausea, vomiting  Eyes:  No visual changes, eye pain or discharge.  ENMT:  No hearing changes, pain,   Cardiac:  No chest pain, SOB or edema.  Respiratory:  No cough or respiratory distress. N  GI:  +nausea, +vomiting, - diarrhea +abdominal pain.  :  No dysuria, frequency or burning.  MS:  No myalgia, muscle weakness  Neuro:  No headache or weakness.  No LOC.  Skin:  No skin rash.   Endocrine: No history of thyroid disease or diabetes.

## 2019-02-18 NOTE — ED ADULT NURSE NOTE - NSIMPLEMENTINTERV_GEN_ALL_ED
Implemented All Universal Safety Interventions:  [place] to call system. Call bell, personal items and telephone within reach. Instruct patient to call for assistance. Room bathroom lighting operational. Non-slip footwear when patient is off stretcher. Physically safe environment: no spills, clutter or unnecessary equipment. Stretcher in lowest position, wheels locked, appropriate side rails in place.

## 2019-02-18 NOTE — ED PROVIDER NOTE - CLINICAL SUMMARY MEDICAL DECISION MAKING FREE TEXT BOX
Pt with chronic AP x 1 mo, but vomiting x 3 days.  Labs, UA, CT unremarkable.  Pt to f/u with GI and/or PCP.  Well appearing at time of d/c.

## 2019-02-18 NOTE — ED PROVIDER NOTE - PROGRESS NOTE DETAILS
per rn , upreg negative results thus far d/w pt. pt resting comfortably. spoke to ct [phone] to let them know pt began contrast 2 hours ago. signed out to dr. guerrero pending ct scan. Pt was signed out to me by Dr. Coy at 2510.  All workup prior to this time was initiated by that provider.  Plan to review labs and imaging and reassess. Reviewed all results and necessity for follow up. Counseled on red flags and to return for them.  Patient appears well on discharge.

## 2019-02-18 NOTE — ED PROVIDER NOTE - CARE PROVIDER_API CALL
Craig Aparicio)  Gastroenterology; Internal Medicine  [address] [place] [address]  Phone: [phone]  Fax: [phone]  Follow Up Time:

## 2019-05-17 ENCOUNTER — EMERGENCY (EMERGENCY)
Facility: HOSPITAL | Age: 29
LOS: 0 days | Discharge: HOME | End: 2019-05-17
Attending: EMERGENCY MEDICINE | Admitting: EMERGENCY MEDICINE
Payer: SUBSIDIZED

## 2019-05-17 VITALS
OXYGEN SATURATION: 100 % | SYSTOLIC BLOOD PRESSURE: 105 MMHG | HEART RATE: 80 BPM | DIASTOLIC BLOOD PRESSURE: 66 MMHG | TEMPERATURE: 97 F | RESPIRATION RATE: 18 BRPM

## 2019-05-17 VITALS
OXYGEN SATURATION: 99 % | HEART RATE: 86 BPM | RESPIRATION RATE: 20 BRPM | TEMPERATURE: 98 F | DIASTOLIC BLOOD PRESSURE: 62 MMHG | SYSTOLIC BLOOD PRESSURE: 119 MMHG

## 2019-05-17 DIAGNOSIS — Z88.0 ALLERGY STATUS TO PENICILLIN: ICD-10-CM

## 2019-05-17 DIAGNOSIS — Z90.49 ACQUIRED ABSENCE OF OTHER SPECIFIED PARTS OF DIGESTIVE TRACT: ICD-10-CM

## 2019-05-17 DIAGNOSIS — R11.10 VOMITING, UNSPECIFIED: ICD-10-CM

## 2019-05-17 DIAGNOSIS — O21.9 VOMITING OF PREGNANCY, UNSPECIFIED: ICD-10-CM

## 2019-05-17 DIAGNOSIS — Z79.810 LONG TERM (CURRENT) USE OF SELECTIVE ESTROGEN RECEPTOR MODULATORS (SERMS): ICD-10-CM

## 2019-05-17 DIAGNOSIS — Z90.49 ACQUIRED ABSENCE OF OTHER SPECIFIED PARTS OF DIGESTIVE TRACT: Chronic | ICD-10-CM

## 2019-05-17 PROBLEM — K21.9 GASTRO-ESOPHAGEAL REFLUX DISEASE WITHOUT ESOPHAGITIS: Chronic | Status: ACTIVE | Noted: 2019-02-18

## 2019-05-17 PROBLEM — G43.909 MIGRAINE, UNSPECIFIED, NOT INTRACTABLE, WITHOUT STATUS MIGRAINOSUS: Chronic | Status: ACTIVE | Noted: 2019-02-18

## 2019-05-17 PROBLEM — K37 UNSPECIFIED APPENDICITIS: Chronic | Status: ACTIVE | Noted: 2019-02-18

## 2019-05-17 LAB
ALBUMIN SERPL ELPH-MCNC: 4.6 G/DL — SIGNIFICANT CHANGE UP (ref 3.5–5.2)
ALP SERPL-CCNC: 55 U/L — SIGNIFICANT CHANGE UP (ref 30–115)
ALT FLD-CCNC: 11 U/L — SIGNIFICANT CHANGE UP (ref 0–41)
ANION GAP SERPL CALC-SCNC: 13 MMOL/L — SIGNIFICANT CHANGE UP (ref 7–14)
APPEARANCE UR: ABNORMAL
AST SERPL-CCNC: 16 U/L — SIGNIFICANT CHANGE UP (ref 0–41)
BACTERIA # UR AUTO: ABNORMAL /HPF
BASOPHILS # BLD AUTO: 0.04 K/UL — SIGNIFICANT CHANGE UP (ref 0–0.2)
BASOPHILS NFR BLD AUTO: 0.5 % — SIGNIFICANT CHANGE UP (ref 0–1)
BILIRUB SERPL-MCNC: 0.5 MG/DL — SIGNIFICANT CHANGE UP (ref 0.2–1.2)
BILIRUB UR-MCNC: NEGATIVE — SIGNIFICANT CHANGE UP
BUN SERPL-MCNC: 14 MG/DL — SIGNIFICANT CHANGE UP (ref 10–20)
CALCIUM SERPL-MCNC: 10 MG/DL — SIGNIFICANT CHANGE UP (ref 8.5–10.1)
CHLORIDE SERPL-SCNC: 103 MMOL/L — SIGNIFICANT CHANGE UP (ref 98–110)
CO2 SERPL-SCNC: 24 MMOL/L — SIGNIFICANT CHANGE UP (ref 17–32)
COLOR SPEC: YELLOW — SIGNIFICANT CHANGE UP
COMMENT - URINE: SIGNIFICANT CHANGE UP
CREAT SERPL-MCNC: 0.7 MG/DL — SIGNIFICANT CHANGE UP (ref 0.7–1.5)
DIFF PNL FLD: NEGATIVE — SIGNIFICANT CHANGE UP
EOSINOPHIL # BLD AUTO: 0.16 K/UL — SIGNIFICANT CHANGE UP (ref 0–0.7)
EOSINOPHIL NFR BLD AUTO: 1.9 % — SIGNIFICANT CHANGE UP (ref 0–8)
EPI CELLS # UR: ABNORMAL /HPF
GLUCOSE SERPL-MCNC: 79 MG/DL — SIGNIFICANT CHANGE UP (ref 70–99)
GLUCOSE UR QL: NEGATIVE MG/DL — SIGNIFICANT CHANGE UP
HCG SERPL QL: POSITIVE — SIGNIFICANT CHANGE UP
HCG SERPL-ACNC: HIGH MIU/ML
HCT VFR BLD CALC: 39.4 % — SIGNIFICANT CHANGE UP (ref 37–47)
HGB BLD-MCNC: 13.6 G/DL — SIGNIFICANT CHANGE UP (ref 12–16)
IMM GRANULOCYTES NFR BLD AUTO: 0.2 % — SIGNIFICANT CHANGE UP (ref 0.1–0.3)
KETONES UR-MCNC: NEGATIVE — SIGNIFICANT CHANGE UP
LACTATE SERPL-SCNC: 0.8 MMOL/L — SIGNIFICANT CHANGE UP (ref 0.5–2.2)
LEUKOCYTE ESTERASE UR-ACNC: NEGATIVE — SIGNIFICANT CHANGE UP
LIDOCAIN IGE QN: 19 U/L — SIGNIFICANT CHANGE UP (ref 7–60)
LYMPHOCYTES # BLD AUTO: 3.3 K/UL — SIGNIFICANT CHANGE UP (ref 1.2–3.4)
LYMPHOCYTES # BLD AUTO: 40 % — SIGNIFICANT CHANGE UP (ref 20.5–51.1)
MCHC RBC-ENTMCNC: 30.1 PG — SIGNIFICANT CHANGE UP (ref 27–31)
MCHC RBC-ENTMCNC: 34.5 G/DL — SIGNIFICANT CHANGE UP (ref 32–37)
MCV RBC AUTO: 87.2 FL — SIGNIFICANT CHANGE UP (ref 81–99)
MONOCYTES # BLD AUTO: 0.84 K/UL — HIGH (ref 0.1–0.6)
MONOCYTES NFR BLD AUTO: 10.2 % — HIGH (ref 1.7–9.3)
NEUTROPHILS # BLD AUTO: 3.88 K/UL — SIGNIFICANT CHANGE UP (ref 1.4–6.5)
NEUTROPHILS NFR BLD AUTO: 47.2 % — SIGNIFICANT CHANGE UP (ref 42.2–75.2)
NITRITE UR-MCNC: NEGATIVE — SIGNIFICANT CHANGE UP
NRBC # BLD: 0 /100 WBCS — SIGNIFICANT CHANGE UP (ref 0–0)
PH UR: 8 — SIGNIFICANT CHANGE UP (ref 5–8)
PLATELET # BLD AUTO: 288 K/UL — SIGNIFICANT CHANGE UP (ref 130–400)
POTASSIUM SERPL-MCNC: 4.2 MMOL/L — SIGNIFICANT CHANGE UP (ref 3.5–5)
POTASSIUM SERPL-SCNC: 4.2 MMOL/L — SIGNIFICANT CHANGE UP (ref 3.5–5)
PROT SERPL-MCNC: 7.2 G/DL — SIGNIFICANT CHANGE UP (ref 6–8)
PROT UR-MCNC: NEGATIVE MG/DL — SIGNIFICANT CHANGE UP
RBC # BLD: 4.52 M/UL — SIGNIFICANT CHANGE UP (ref 4.2–5.4)
RBC # FLD: 12.1 % — SIGNIFICANT CHANGE UP (ref 11.5–14.5)
SODIUM SERPL-SCNC: 140 MMOL/L — SIGNIFICANT CHANGE UP (ref 135–146)
SP GR SPEC: 1.02 — SIGNIFICANT CHANGE UP (ref 1.01–1.03)
UROBILINOGEN FLD QL: 1 MG/DL (ref 0.2–0.2)
WBC # BLD: 8.24 K/UL — SIGNIFICANT CHANGE UP (ref 4.8–10.8)
WBC # FLD AUTO: 8.24 K/UL — SIGNIFICANT CHANGE UP (ref 4.8–10.8)
WBC UR QL: SIGNIFICANT CHANGE UP /HPF

## 2019-05-17 PROCEDURE — 76830 TRANSVAGINAL US NON-OB: CPT | Mod: 26

## 2019-05-17 PROCEDURE — 99284 EMERGENCY DEPT VISIT MOD MDM: CPT

## 2019-05-17 RX ORDER — PYRIDOXINE HCL (VITAMIN B6) 100 MG
50 TABLET ORAL ONCE
Refills: 0 | Status: COMPLETED | OUTPATIENT
Start: 2019-05-17 | End: 2019-05-17

## 2019-05-17 RX ORDER — DOXYLAMINE SUCCINATE 25 MG
1 TABLET ORAL
Qty: 30 | Refills: 0
Start: 2019-05-17 | End: 2019-06-15

## 2019-05-17 RX ORDER — PYRIDOXINE HCL (VITAMIN B6) 100 MG
10 TABLET ORAL ONCE
Refills: 0 | Status: DISCONTINUED | OUTPATIENT
Start: 2019-05-17 | End: 2019-05-17

## 2019-05-17 RX ORDER — SODIUM CHLORIDE 9 MG/ML
1000 INJECTION, SOLUTION INTRAVENOUS ONCE
Refills: 0 | Status: COMPLETED | OUTPATIENT
Start: 2019-05-17 | End: 2019-05-17

## 2019-05-17 RX ORDER — PYRIDOXINE HCL (VITAMIN B6) 100 MG
1 TABLET ORAL
Qty: 30 | Refills: 0
Start: 2019-05-17 | End: 2019-06-15

## 2019-05-17 RX ORDER — ONDANSETRON 8 MG/1
4 TABLET, FILM COATED ORAL ONCE
Refills: 0 | Status: COMPLETED | OUTPATIENT
Start: 2019-05-17 | End: 2019-05-17

## 2019-05-17 RX ADMIN — Medication 50 MILLIGRAM(S): at 20:02

## 2019-05-17 RX ADMIN — ONDANSETRON 4 MILLIGRAM(S): 8 TABLET, FILM COATED ORAL at 14:41

## 2019-05-17 RX ADMIN — SODIUM CHLORIDE 1000 MILLILITER(S): 9 INJECTION, SOLUTION INTRAVENOUS at 18:48

## 2019-05-17 RX ADMIN — SODIUM CHLORIDE 1000 MILLILITER(S): 9 INJECTION, SOLUTION INTRAVENOUS at 14:43

## 2019-05-17 NOTE — ED PROVIDER NOTE - CLINICAL SUMMARY MEDICAL DECISION MAKING FREE TEXT BOX
pw nausea, vomiting, given fluids, IUP identified, tolerating PO. Patient to be discharged from ED. Any available test results were discussed with patient and/or family. Verbal instructions given, including instructions to return to ED immediately for any new, worsening, or concerning symptoms. Patient endorsed understanding. Written discharge instructions additionally given, including follow-up plan.

## 2019-05-17 NOTE — ED PROVIDER NOTE - NSFOLLOWUPINSTRUCTIONS_ED_ALL_ED_FT
Two prescriptions have been sent to your pharmacy. For both medications, cut the pill in half and take each once a day.  Follow up with scheduled OB/GYN appointment on Monday.     Hyperemesis Gravidarum  Hyperemesis gravidarum is a severe form of nausea and vomiting that happens during pregnancy. Hyperemesis is worse than morning sickness. It may cause you to have nausea or vomiting all day for many days. It may keep you from eating and drinking enough food and liquids, which can lead to dehydration, malnutrition, and weight loss. Hyperemesis usually occurs during the first half (the first 20 weeks) of pregnancy. It often goes away once a woman is in her second half of pregnancy. However, sometimes hyperemesis continues through an entire pregnancy.    What are the causes?  The cause of this condition is not known. It may be related to changes in chemicals (hormones) in the body during pregnancy, such as the high level of pregnancy hormone (human chorionic gonadotropin) or the increase in the female sex hormone (estrogen).    What are the signs or symptoms?  Symptoms of this condition include:  Nausea that does not go away.  Vomiting that does not allow you to keep any food down.  Weight loss.  Body fluid loss (dehydration).  Having no desire to eat, or not liking food that you have previously enjoyed.  How is this diagnosed?  This condition may be diagnosed based on:  A physical exam.  Your medical history.  Your symptoms.  Blood tests.  Urine tests.  How is this treated?  This condition is managed by controlling symptoms. This may include:  Following an eating plan. This can help lessen nausea and vomiting.  Taking prescription medicines.  An eating plan and medicines are often used together to help control symptoms. If medicines do not help relieve nausea and vomiting, you may need to receive fluids through an IV at the hospital.    Follow these instructions at home:  Eating and drinking     Avoid the following:  Drinking fluids with meals. Try not to drink anything during the 30 minutes before and after your meals.  Drinking more than 1 cup of fluid at a time.  Eating foods that trigger your symptoms. These may include spicy foods, coffee, high-fat foods, very sweet foods, and acidic foods.  Skipping meals. Nausea can be more intense on an empty stomach. If you cannot tolerate food, do not force it. Try sucking on ice chips or other frozen items and make up for missed calories later.  Lying down within 2 hours after eating.  Being exposed to environmental triggers. These may include food smells, smoky rooms, closed spaces, rooms with strong smells, warm or humid places, overly loud and noisy rooms, and rooms with motion or flickering lights. Try eating meals in a well-ventilated area that is free of strong smells.  Quick and sudden changes in your movement.  Taking iron pills and multivitamins that contain iron. If you take prescription iron pills, do not stop taking them unless your health care provider approves.  Preparing food. The smell of food can spoil your appetite or trigger nausea.  To help relieve your symptoms:  Listen to your body. Everyone is different and has different preferences. Find what works best for you.  Eat and drink slowly.  Eat 5–6 small meals daily instead of 3 large meals. Eating small meals and snacks can help you avoid an empty stomach.  In the morning, before getting out of bed, eat a couple of crackers to avoid moving around on an empty stomach.  Try eating starchy foods as these are usually tolerated well. Examples include cereal, toast, bread, potatoes, pasta, rice, and pretzels.  Include at least 1 serving of protein with your meals and snacks. Protein options include lean meats, poultry, seafood, beans, nuts, nut butters, eggs, cheese, and yogurt.  Try eating a protein-rich snack before bed. Examples of a protein-nigel snack include cheese and crackers or a peanut butter sandwich made with 1 slice of whole-wheat bread and 1 tsp (5 g) of peanut butter.  Eat or suck on things that have ginger in them. It may help relieve nausea. Add ¼ tsp ground ginger to hot tea or choose ginger tea.  Try drinking 100% fruit juice or an electrolyte drink. An electrolyte drink contains sodium, potassium, and chloride.  Drink fluids that are cold, clear, and carbonated or sour. Examples include lemonade, ginger ale, lemon–lime soda, ice water, and sparkling water.  Brush your teeth or use a mouth rinse after meals.  Talk with your health care provider about starting a supplement of vitamin B6.  General instructions     Take over-the-counter and prescription medicines only as told by your health care provider.  Follow instructions from your health care provider about eating or drinking restrictions.  Continue to take your prenatal vitamins as told by your health care provider. If you are having trouble taking your prenatal vitamins, talk with your health care provider about different options.  Keep all follow-up and pre-birth (prenatal) visits as told by your health care provider. This is important.  Contact a health care provider if:  You have pain in your abdomen.  You have a severe headache.  You have vision problems.  You are losing weight.  You feel weak or dizzy.  Get help right away if:  You cannot drink fluids without vomiting.  You vomit blood.  You have constant nausea and vomiting.  You are very weak.  You faint.  You have a fever and your symptoms suddenly get worse.  Summary  Hyperemesis gravidarum is a severe form of nausea and vomiting that happens during pregnancy.  Making some changes to your eating habits may help relieve nausea and vomiting.  This condition may be managed with medicine.  If medicines do not help relieve nausea and vomiting, you may need to receive fluids through an IV at the hospital.  This information is not intended to replace advice given to you by your health care provider. Make sure you discuss any questions you have with your health care provider.

## 2019-05-17 NOTE — ED PROVIDER NOTE - OBJECTIVE STATEMENT
29 yo  female with PMH of miscarriage (2018) presents to the ED c/o non-bloody, hyperemesis secondary to pregnancy associated with achy, intermittent abdominal pain and nausea.  Patient is approximately 6-8 weeks pregnant.  Patient states she has had similar episodes with both previous pregnancies.  Patient has an appointment with women's clinic on Monday, located in [facility], but states she could not wait until then and came to the ED for symptomatic relief.  Patient denies fever, chills, chest pain, SOB, dysuria, hematuria, hematochezia, abnormal vaginal bleeding or discharge, back pain, recent illness or travel, illicit drug use, alcohol use, or hx of complications with past pregnancies.

## 2019-05-17 NOTE — ED PROVIDER NOTE - ATTENDING CONTRIBUTION TO CARE
27 yo  female with PMH of miscarriage (2018) pw nausea increasing throughout her 8wks by dates pregnancy, with vomiting nbnb, worsening x 5 days only able to keep down fluids. No fever, chills, vaginal bleeding or discharge, dysuria, hematuria, numbness, tingling, weakness.  Exam: NAD, NCAT, HEENT: dry mm, EOMI, PERRLA, Neck: supple, nontender, nl ROM, Heart: RRR, no murmur, Lungs: BCTA, no signs of increased WOB, Abd: NTND, no guarding or rebound, no hernia palpated, no CVAT. MSK: chest, back, and ext nontender, nl rom, no deformity. Neuro: A&Ox3, normal strength, nl sensation throughout, normal speech.   A/P: Eval for IUP, complications, electrolyte abnormality, or other concern.

## 2019-05-17 NOTE — PHARMACOTHERAPY INTERVENTION NOTE - COMMENTS
Spoke with PA and explained we do not carry pyridoxine 10 mg tablets and just the 50 mg tablets. PA approved and changed dose to 50 mg

## 2019-05-17 NOTE — ED PROVIDER NOTE - PHYSICAL EXAMINATION
GENERAL: Well-nourished, Well-developed. NAD.  ENMT: MMM. No pharyngeal erythema or exudates. Uvula midline. No oral lesions.  Neck: FROM  CVS: No reproducible chest wall tenderness. Normal S1,S2. No murmurs appreciated on auscultation   RESP: No use of accessory muscles. Chest rise symmetrical with good expansion. Lungs clear to auscultation B/L. No wheezing, rales, or rhonchi auscultated.  GI: Normal auscultation of bowel sounds in all 4 quadrants. Soft, Nontender, Nondistended. No guarding. No CVAT B/L.  MSK: FROM of upper and lower extremities B/L.   Skin: Warm, Dry. No rashes or lesions.  EXT: Radial and pedal pulses present B/L. No calf tenderness or swelling B/L. No pedal edema B/L.  Neuro: AA&O x 3. CNs II-XII grossly intact. Speaking in full sentences. No slurring of speech. No facial droop. No tremors. Sensation grossly intact. Strength 5/5 B/L. Gait within normal limits.   Psych:  Appropriate mood and affect. Cooperative. Calm

## 2019-05-17 NOTE — ED ADULT NURSE NOTE - OBJECTIVE STATEMENT
pt 29 y/o female, c/o vomiting and nausea, pt pregnant , pt states that she has been unable to keep anythin down.

## 2019-05-17 NOTE — ED PROVIDER NOTE - NSFOLLOWUPCLINICS_GEN_ALL_ED_FT
[facility]  OB/GYN  [address] [place] [address]  Phone: [phone]  Fax:   Follow Up Time:

## 2019-05-19 LAB
CULTURE RESULTS: NO GROWTH — SIGNIFICANT CHANGE UP
SPECIMEN SOURCE: SIGNIFICANT CHANGE UP

## 2019-06-03 ENCOUNTER — EMERGENCY (EMERGENCY)
Facility: HOSPITAL | Age: 29
LOS: 0 days | Discharge: HOME | End: 2019-06-03
Attending: EMERGENCY MEDICINE | Admitting: EMERGENCY MEDICINE
Payer: SUBSIDIZED

## 2019-06-03 VITALS
HEART RATE: 78 BPM | OXYGEN SATURATION: 100 % | TEMPERATURE: 98 F | RESPIRATION RATE: 18 BRPM | SYSTOLIC BLOOD PRESSURE: 110 MMHG | DIASTOLIC BLOOD PRESSURE: 66 MMHG

## 2019-06-03 VITALS
RESPIRATION RATE: 18 BRPM | SYSTOLIC BLOOD PRESSURE: 102 MMHG | DIASTOLIC BLOOD PRESSURE: 62 MMHG | HEART RATE: 83 BPM | OXYGEN SATURATION: 100 % | TEMPERATURE: 98 F
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
## 2019-06-03 DIAGNOSIS — Z88.0 ALLERGY STATUS TO PENICILLIN: ICD-10-CM

## 2019-06-03 DIAGNOSIS — O9A.211 INJURY, POISONING AND CERTAIN OTHER CONSEQUENCES OF EXTERNAL CAUSES COMPLICATING PREGNANCY, FIRST TRIMESTER: ICD-10-CM

## 2019-06-03 DIAGNOSIS — Z3A.09 9 WEEKS GESTATION OF PREGNANCY: ICD-10-CM

## 2019-06-03 DIAGNOSIS — O21.0 MILD HYPEREMESIS GRAVIDARUM: ICD-10-CM

## 2019-06-03 DIAGNOSIS — S39.91XA UNSPECIFIED INJURY OF ABDOMEN, INITIAL ENCOUNTER: ICD-10-CM
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
## 2019-06-03 DIAGNOSIS — T74.11XA ADULT PHYSICAL ABUSE, CONFIRMED, INITIAL ENCOUNTER: ICD-10-CM

## 2019-06-03 DIAGNOSIS — Z90.49 ACQUIRED ABSENCE OF OTHER SPECIFIED PARTS OF DIGESTIVE TRACT: Chronic | ICD-10-CM
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
## 2019-06-03 PROBLEM — O03.9 COMPLETE OR UNSPECIFIED SPONTANEOUS ABORTION WITHOUT COMPLICATION: Chronic | Status: ACTIVE | Noted: 2019-05-17

## 2019-06-03 LAB
ALBUMIN SERPL ELPH-MCNC: 4.2 G/DL — SIGNIFICANT CHANGE UP (ref 3.5–5.2)
ALP SERPL-CCNC: 52 U/L — SIGNIFICANT CHANGE UP (ref 30–115)
ALT FLD-CCNC: 12 U/L — SIGNIFICANT CHANGE UP (ref 0–41)
ANION GAP SERPL CALC-SCNC: 12 MMOL/L — SIGNIFICANT CHANGE UP (ref 7–14)
APPEARANCE UR: CLEAR — SIGNIFICANT CHANGE UP
APTT BLD: 34.7 SEC — SIGNIFICANT CHANGE UP (ref 27–39.2)
AST SERPL-CCNC: 15 U/L — SIGNIFICANT CHANGE UP (ref 0–41)
BASOPHILS # BLD AUTO: 0.04 K/UL — SIGNIFICANT CHANGE UP (ref 0–0.2)
BASOPHILS NFR BLD AUTO: 0.4 % — SIGNIFICANT CHANGE UP (ref 0–1)
BILIRUB SERPL-MCNC: 0.3 MG/DL — SIGNIFICANT CHANGE UP (ref 0.2–1.2)
BILIRUB UR-MCNC: NEGATIVE — SIGNIFICANT CHANGE UP
BLD GP AB SCN SERPL QL: SIGNIFICANT CHANGE UP
BUN SERPL-MCNC: 9 MG/DL — LOW (ref 10–20)
CALCIUM SERPL-MCNC: 9.4 MG/DL — SIGNIFICANT CHANGE UP (ref 8.5–10.1)
CHLORIDE SERPL-SCNC: 102 MMOL/L — SIGNIFICANT CHANGE UP (ref 98–110)
CO2 SERPL-SCNC: 25 MMOL/L — SIGNIFICANT CHANGE UP (ref 17–32)
COLOR SPEC: YELLOW — SIGNIFICANT CHANGE UP
CREAT SERPL-MCNC: 0.6 MG/DL — LOW (ref 0.7–1.5)
DIFF PNL FLD: NEGATIVE — SIGNIFICANT CHANGE UP
EOSINOPHIL # BLD AUTO: 0.07 K/UL — SIGNIFICANT CHANGE UP (ref 0–0.7)
EOSINOPHIL NFR BLD AUTO: 0.7 % — SIGNIFICANT CHANGE UP (ref 0–8)
GLUCOSE SERPL-MCNC: 95 MG/DL — SIGNIFICANT CHANGE UP (ref 70–99)
GLUCOSE UR QL: NEGATIVE MG/DL — SIGNIFICANT CHANGE UP
HCG SERPL-ACNC: HIGH MIU/ML
HCT VFR BLD CALC: 37.8 % — SIGNIFICANT CHANGE UP (ref 37–47)
HGB BLD-MCNC: 12.8 G/DL — SIGNIFICANT CHANGE UP (ref 12–16)
IMM GRANULOCYTES NFR BLD AUTO: 0.4 % — HIGH (ref 0.1–0.3)
INR BLD: 0.99 RATIO — SIGNIFICANT CHANGE UP (ref 0.65–1.3)
KETONES UR-MCNC: NEGATIVE — SIGNIFICANT CHANGE UP
LEUKOCYTE ESTERASE UR-ACNC: NEGATIVE — SIGNIFICANT CHANGE UP
LYMPHOCYTES # BLD AUTO: 2.19 K/UL — SIGNIFICANT CHANGE UP (ref 1.2–3.4)
LYMPHOCYTES # BLD AUTO: 22.5 % — SIGNIFICANT CHANGE UP (ref 20.5–51.1)
MCHC RBC-ENTMCNC: 29.9 PG — SIGNIFICANT CHANGE UP (ref 27–31)
MCHC RBC-ENTMCNC: 33.9 G/DL — SIGNIFICANT CHANGE UP (ref 32–37)
MCV RBC AUTO: 88.3 FL — SIGNIFICANT CHANGE UP (ref 81–99)
MONOCYTES # BLD AUTO: 0.77 K/UL — HIGH (ref 0.1–0.6)
MONOCYTES NFR BLD AUTO: 7.9 % — SIGNIFICANT CHANGE UP (ref 1.7–9.3)
NEUTROPHILS # BLD AUTO: 6.61 K/UL — HIGH (ref 1.4–6.5)
NEUTROPHILS NFR BLD AUTO: 68.1 % — SIGNIFICANT CHANGE UP (ref 42.2–75.2)
NITRITE UR-MCNC: NEGATIVE — SIGNIFICANT CHANGE UP
NRBC # BLD: 0 /100 WBCS — SIGNIFICANT CHANGE UP (ref 0–0)
PH UR: 7.5 — SIGNIFICANT CHANGE UP (ref 5–8)
PLATELET # BLD AUTO: 281 K/UL — SIGNIFICANT CHANGE UP (ref 130–400)
POTASSIUM SERPL-MCNC: 3.3 MMOL/L — LOW (ref 3.5–5)
POTASSIUM SERPL-SCNC: 3.3 MMOL/L — LOW (ref 3.5–5)
PROT SERPL-MCNC: 6.9 G/DL — SIGNIFICANT CHANGE UP (ref 6–8)
PROT UR-MCNC: NEGATIVE MG/DL — SIGNIFICANT CHANGE UP
PROTHROM AB SERPL-ACNC: 11.4 SEC — SIGNIFICANT CHANGE UP (ref 9.95–12.87)
RBC # BLD: 4.28 M/UL — SIGNIFICANT CHANGE UP (ref 4.2–5.4)
RBC # FLD: 12.6 % — SIGNIFICANT CHANGE UP (ref 11.5–14.5)
SODIUM SERPL-SCNC: 139 MMOL/L — SIGNIFICANT CHANGE UP (ref 135–146)
SP GR SPEC: 1.01 — SIGNIFICANT CHANGE UP (ref 1.01–1.03)
UROBILINOGEN FLD QL: 0.2 MG/DL — SIGNIFICANT CHANGE UP (ref 0.2–0.2)
WBC # BLD: 9.72 K/UL — SIGNIFICANT CHANGE UP (ref 4.8–10.8)
WBC # FLD AUTO: 9.72 K/UL — SIGNIFICANT CHANGE UP (ref 4.8–10.8)

## 2019-06-03 PROCEDURE — 76856 US EXAM PELVIC COMPLETE: CPT | Mod: 26

## 2019-06-03 PROCEDURE — 99284 EMERGENCY DEPT VISIT MOD MDM: CPT

## 2019-06-03 PROCEDURE — 99243 OFF/OP CNSLTJ NEW/EST LOW 30: CPT

## 2019-06-03 RX ORDER — ONDANSETRON 8 MG/1
4 TABLET, FILM COATED ORAL ONCE
Refills: 0 | Status: COMPLETED | OUTPATIENT
Start: 2019-06-03 | End: 2019-06-03

## 2019-06-03 RX ORDER — METOCLOPRAMIDE HCL 10 MG
5 TABLET ORAL ONCE
Refills: 0 | Status: COMPLETED | OUTPATIENT
Start: 2019-06-03 | End: 2019-06-03

## 2019-06-03 RX ORDER — ONDANSETRON 8 MG/1
1 TABLET, FILM COATED ORAL
Qty: 12 | Refills: 0
Start: 2019-06-03 | End: 2019-06-06

## 2019-06-03 RX ORDER — SODIUM CHLORIDE 9 MG/ML
1000 INJECTION, SOLUTION INTRAVENOUS ONCE
Refills: 0 | Status: COMPLETED | OUTPATIENT
Start: 2019-06-03 | End: 2019-06-03

## 2019-06-03 RX ADMIN — ONDANSETRON 4 MILLIGRAM(S): 8 TABLET, FILM COATED ORAL at 12:46

## 2019-06-03 RX ADMIN — Medication 5 MILLIGRAM(S): at 14:41

## 2019-06-03 RX ADMIN — SODIUM CHLORIDE 1000 MILLILITER(S): 9 INJECTION, SOLUTION INTRAVENOUS at 14:10

## 2019-06-03 RX ADMIN — SODIUM CHLORIDE 1000 MILLILITER(S): 9 INJECTION, SOLUTION INTRAVENOUS at 12:46

## 2019-06-03 NOTE — ED PROVIDER NOTE - PHYSICAL EXAMINATION
CONSTITUTIONAL: Well-developed; well-nourished; in no acute distress.   SKIN: warm, dry. no ecchymosis, lacerations or abrasions.   HEAD: Normocephalic; atraumatic.  EYES: PERRL, EOMI, no conjunctival erythema  ENT: No nasal discharge; airway clear.  NECK: Supple; non tender.  CARD: S1, S2 normal;  Regular rate and rhythm.   RESP: No wheezes, rales or rhonchi.  ABD: soft non tender, non distended, no rebound or guarding  EXT: Normal ROM.    LYMPH: No acute cervical adenopathy.  NEURO: Alert, oriented, grossly unremarkable.
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
## 2019-06-03 NOTE — ED PROVIDER NOTE - CARE PLAN
Principal Discharge DX:	Hyperemesis gravidarum  Secondary Diagnosis:	Abdominal trauma, initial encounter  Secondary Diagnosis:	9 weeks gestation of pregnancy
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
## 2019-06-03 NOTE — ED PROVIDER NOTE - NSFOLLOWUPCLINICS_GEN_ALL_ED_FT
[facility]  OB/GYN  [address] [place] [address]  Phone: [phone]  Fax:   Follow Up Time:

## 2019-06-04 LAB
CULTURE RESULTS: NO GROWTH — SIGNIFICANT CHANGE UP
SPECIMEN SOURCE: SIGNIFICANT CHANGE UP

## 2019-06-20 ENCOUNTER — LABORATORY RESULT (OUTPATIENT)
Age: 29
End: 2019-06-20

## 2019-06-21 ENCOUNTER — APPOINTMENT (OUTPATIENT)
Dept: ANTEPARTUM | Facility: CLINIC | Age: 29
End: 2019-06-21
Payer: MEDICAID

## 2019-06-21 ENCOUNTER — TRANSCRIPTION ENCOUNTER (OUTPATIENT)
Age: 29
End: 2019-06-21

## 2019-06-21 ENCOUNTER — RESULT CHARGE (OUTPATIENT)
Age: 29
End: 2019-06-21

## 2019-06-21 ENCOUNTER — OUTPATIENT (OUTPATIENT)
Dept: OUTPATIENT SERVICES | Facility: HOSPITAL | Age: 29
LOS: 1 days | Discharge: HOME | End: 2019-06-21

## 2019-06-21 VITALS
WEIGHT: 149 LBS | DIASTOLIC BLOOD PRESSURE: 70 MMHG | SYSTOLIC BLOOD PRESSURE: 108 MMHG | HEART RATE: 77 BPM | TEMPERATURE: 99.2 F | OXYGEN SATURATION: 100 %

## 2019-06-21 DIAGNOSIS — Z90.49 ACQUIRED ABSENCE OF OTHER SPECIFIED PARTS OF DIGESTIVE TRACT: Chronic | ICD-10-CM

## 2019-06-21 DIAGNOSIS — Z82.49 FAMILY HISTORY OF ISCHEMIC HEART DISEASE AND OTHER DISEASES OF THE CIRCULATORY SYSTEM: ICD-10-CM

## 2019-06-21 DIAGNOSIS — O21.9 VOMITING OF PREGNANCY, UNSPECIFIED: ICD-10-CM

## 2019-06-21 DIAGNOSIS — Z80.0 FAMILY HISTORY OF MALIGNANT NEOPLASM OF DIGESTIVE ORGANS: ICD-10-CM

## 2019-06-21 DIAGNOSIS — K21.9 GASTRO-ESOPHAGEAL REFLUX DISEASE W/OUT ESOPHAGITIS: ICD-10-CM

## 2019-06-21 DIAGNOSIS — Z80.1 FAMILY HISTORY OF MALIGNANT NEOPLASM OF TRACHEA, BRONCHUS AND LUNG: ICD-10-CM

## 2019-06-21 PROBLEM — Z00.00 ENCOUNTER FOR PREVENTIVE HEALTH EXAMINATION: Status: ACTIVE | Noted: 2019-06-21

## 2019-06-21 LAB
BILIRUB UR QL STRIP: NEGATIVE
CLARITY UR: CLEAR
COLLECTION METHOD: NORMAL
FETAL MOVEMENT: NORMAL
GLUCOSE UR-MCNC: NEGATIVE
HCG UR QL: 0.2 EU/DL
HGB UR QL STRIP.AUTO: NEGATIVE
KETONES UR-MCNC: NEGATIVE
LEUKOCYTE ESTERASE UR QL STRIP: NEGATIVE
NITRITE UR QL STRIP: NEGATIVE
PH UR STRIP: 6
PROT UR STRIP-MCNC: NEGATIVE
SP GR UR STRIP: 1.02
URINE ALBUMIN/PROTEIN: NEGATIVE
URINE GLUCOSE: NEGATIVE
URINE KETONES: NEGATIVE

## 2019-06-21 PROCEDURE — 99215 OFFICE O/P EST HI 40 MIN: CPT | Mod: 25

## 2019-06-21 PROCEDURE — 76813 OB US NUCHAL MEAS 1 GEST: CPT | Mod: 26

## 2019-06-21 RX ORDER — PYRIDOXINE HCL (VITAMIN B6) 25 MG
25 TABLET ORAL
Qty: 30 | Refills: 2 | Status: ACTIVE | COMMUNITY
Start: 2019-06-21 | End: 1900-01-01

## 2019-06-21 RX ORDER — PYRIDOXINE HCL (VITAMIN B6) 50 MG
TABLET ORAL
Refills: 0 | Status: ACTIVE | COMMUNITY

## 2019-06-21 RX ORDER — DOXYLAMINE SUCCINATE 25 MG
TABLET ORAL
Refills: 0 | Status: ACTIVE | COMMUNITY

## 2019-06-21 RX ORDER — DOXYLAMINE SUCCINATE 25 MG
25 TABLET ORAL
Qty: 30 | Refills: 2 | Status: ACTIVE | COMMUNITY
Start: 2019-06-21 | End: 1900-01-01

## 2019-06-21 RX ORDER — PROCHLORPERAZINE 25 MG/1
25 SUPPOSITORY RECTAL TWICE DAILY
Qty: 60 | Refills: 2 | Status: ACTIVE | COMMUNITY
Start: 2019-06-21 | End: 1900-01-01

## 2019-06-21 NOTE — DISCUSSION/SUMMARY
[FreeTextEntry1] : 28 y/o  at 11w6d by first trimester sonogram done at [facility] ED with EDC of 2020, prenatal care at [facility] with Dr. Renee, sent for consult for nausea and vomiting. Denies abdominal cramping and vaginal bleeding.\par \par # nausea and vomiting of pregnancy\par - 3.8% weight loss\par - Udip negative for ketones\par - encouraged to increase B6 frequency to TID. In addition advised patient to use compazine [place] since other PO nausea medications make her vomit. \par - Sent prescriptions to pharmacy\par - sent for CBC, CMP\par - discussed with patient that most likely her feeling will improve in the upcoming weeks. \par \par # pregnancy\par - nuchal screen done today\par - patient wishes to transfer prenatal care to [facility]\par - prenatal labs done at [facility]. Will obtain records. \par \par Follow up at low risk clinic.

## 2019-06-21 NOTE — SURGICAL HISTORY
[Abn Paps] : abnormal pap smear [Cysts] : cysts [Fibroids] : no fibroids [Breast Disease] : no breast disease [STI's] : no STI's [de-identified] : Reports h/o abnormal PAP smears and pre-cancerous biopsy on colposcopy 2 years ago. No cervical procedure was done. Had repeat PAP smear last week at [facility]. Result still unavailable.

## 2019-06-21 NOTE — HISTORY OF PRESENT ILLNESS
[FreeTextEntry1] : 28 y/o  at 11w6d by first trimester sonogram done at [facility] ED with EDC of 2020, prenatal care at [facility] with Dr. Renee, sent for a consult for nausea and vomiting of pregnancy. Patient reports that since she found out she was pregnant has persistent nausea and vomiting, approximately 4-5 times/day. Vomit nonbillous, nonbloody. Unable to tolerate food or liquids. Pre pregnancy weight was 155 and weight today is 149 (3.8% weight loss). Multiple admissions to [facility] ED for these symptoms where she was treated with IV fluids and IV zofran. At home takes pyridoxine once daily and doxylamine once daily which was prescribed by her doctor. Does not take zofran because it makes her vomit.

## 2019-06-21 NOTE — PHYSICAL EXAM
[FreeTextEntry1] : GEN: NAD, AAOX3\par CVS: RRR, normal S1/S2\par Lungs: CTAB\par Abd: soft, gravid, nontender, no palpable ctx\par Ext: no calf tenderness or edema\par

## 2019-06-21 NOTE — OB HISTORY
[Pregnancy History] : boy [EL: ___] : EL: [unfilled] [LMP: ___] : LMP: [unfilled] [___] : no pregnancy complications reported [FreeTextEntry1] : SAB with no D&C

## 2019-06-25 DIAGNOSIS — Z36.82 ENCOUNTER FOR ANTENATAL SCREENING FOR NUCHAL TRANSLUCENCY: ICD-10-CM

## 2019-06-25 DIAGNOSIS — O21.9 VOMITING OF PREGNANCY, UNSPECIFIED: ICD-10-CM

## 2019-06-25 DIAGNOSIS — Z3A.11 11 WEEKS GESTATION OF PREGNANCY: ICD-10-CM

## 2019-06-25 DIAGNOSIS — Z13.84 ENCOUNTER FOR SCREENING FOR DENTAL DISORDERS: ICD-10-CM

## 2019-07-01 ENCOUNTER — EMERGENCY (EMERGENCY)
Facility: HOSPITAL | Age: 29
LOS: 0 days | Discharge: HOME | End: 2019-07-02
Attending: EMERGENCY MEDICINE | Admitting: EMERGENCY MEDICINE
Payer: SUBSIDIZED

## 2019-07-01 VITALS
RESPIRATION RATE: 18 BRPM | SYSTOLIC BLOOD PRESSURE: 112 MMHG | TEMPERATURE: 98 F | HEART RATE: 76 BPM | DIASTOLIC BLOOD PRESSURE: 75 MMHG | OXYGEN SATURATION: 99 %

## 2019-07-01 DIAGNOSIS — Z87.19 PERSONAL HISTORY OF OTHER DISEASES OF THE DIGESTIVE SYSTEM: ICD-10-CM

## 2019-07-01 DIAGNOSIS — Z3A.13 13 WEEKS GESTATION OF PREGNANCY: ICD-10-CM

## 2019-07-01 DIAGNOSIS — M25.511 PAIN IN RIGHT SHOULDER: ICD-10-CM

## 2019-07-01 DIAGNOSIS — O24.410 GESTATIONAL DIABETES MELLITUS IN PREGNANCY, DIET CONTROLLED: ICD-10-CM

## 2019-07-01 DIAGNOSIS — Z90.49 ACQUIRED ABSENCE OF OTHER SPECIFIED PARTS OF DIGESTIVE TRACT: Chronic | ICD-10-CM

## 2019-07-01 DIAGNOSIS — O21.1 HYPEREMESIS GRAVIDARUM WITH METABOLIC DISTURBANCE: ICD-10-CM

## 2019-07-01 DIAGNOSIS — R55 SYNCOPE AND COLLAPSE: ICD-10-CM

## 2019-07-01 DIAGNOSIS — R07.89 OTHER CHEST PAIN: ICD-10-CM

## 2019-07-01 DIAGNOSIS — R10.2 PELVIC AND PERINEAL PAIN: ICD-10-CM

## 2019-07-01 LAB
ALBUMIN SERPL ELPH-MCNC: 3.9 G/DL — SIGNIFICANT CHANGE UP (ref 3.5–5.2)
ALP SERPL-CCNC: 50 U/L — SIGNIFICANT CHANGE UP (ref 30–115)
ALT FLD-CCNC: 11 U/L — SIGNIFICANT CHANGE UP (ref 0–41)
ANION GAP SERPL CALC-SCNC: 14 MMOL/L — SIGNIFICANT CHANGE UP (ref 7–14)
APPEARANCE UR: ABNORMAL
AST SERPL-CCNC: 17 U/L — SIGNIFICANT CHANGE UP (ref 0–41)
BACTERIA # UR AUTO: ABNORMAL /HPF
BASE EXCESS BLDV CALC-SCNC: 3.2 MMOL/L — HIGH (ref -2–2)
BASOPHILS # BLD AUTO: 0.04 K/UL — SIGNIFICANT CHANGE UP (ref 0–0.2)
BASOPHILS NFR BLD AUTO: 0.5 % — SIGNIFICANT CHANGE UP (ref 0–1)
BILIRUB SERPL-MCNC: 0.4 MG/DL — SIGNIFICANT CHANGE UP (ref 0.2–1.2)
BILIRUB UR-MCNC: NEGATIVE — SIGNIFICANT CHANGE UP
BUN SERPL-MCNC: 10 MG/DL — SIGNIFICANT CHANGE UP (ref 10–20)
CA-I SERPL-SCNC: 1.23 MMOL/L — SIGNIFICANT CHANGE UP (ref 1.12–1.3)
CALCIUM SERPL-MCNC: 9.4 MG/DL — SIGNIFICANT CHANGE UP (ref 8.5–10.1)
CHLORIDE SERPL-SCNC: 101 MMOL/L — SIGNIFICANT CHANGE UP (ref 98–110)
CO2 SERPL-SCNC: 23 MMOL/L — SIGNIFICANT CHANGE UP (ref 17–32)
COLOR SPEC: YELLOW — SIGNIFICANT CHANGE UP
CREAT SERPL-MCNC: 0.6 MG/DL — LOW (ref 0.7–1.5)
D DIMER BLD IA.RAPID-MCNC: 92 NG/ML DDU — SIGNIFICANT CHANGE UP (ref 0–230)
DIFF PNL FLD: NEGATIVE — SIGNIFICANT CHANGE UP
EOSINOPHIL # BLD AUTO: 0.18 K/UL — SIGNIFICANT CHANGE UP (ref 0–0.7)
EOSINOPHIL NFR BLD AUTO: 2.1 % — SIGNIFICANT CHANGE UP (ref 0–8)
EPI CELLS # UR: ABNORMAL /HPF
GAS PNL BLDV: 137 MMOL/L — SIGNIFICANT CHANGE UP (ref 136–145)
GAS PNL BLDV: SIGNIFICANT CHANGE UP
GLUCOSE SERPL-MCNC: 82 MG/DL — SIGNIFICANT CHANGE UP (ref 70–99)
GLUCOSE UR QL: NEGATIVE MG/DL — SIGNIFICANT CHANGE UP
HCO3 BLDV-SCNC: 28 MMOL/L — SIGNIFICANT CHANGE UP (ref 22–29)
HCT VFR BLD CALC: 36.7 % — LOW (ref 37–47)
HCT VFR BLDA CALC: 41.8 % — SIGNIFICANT CHANGE UP (ref 34–44)
HGB BLD CALC-MCNC: 13.6 G/DL — LOW (ref 14–18)
HGB BLD-MCNC: 12.6 G/DL — SIGNIFICANT CHANGE UP (ref 12–16)
IMM GRANULOCYTES NFR BLD AUTO: 0.4 % — HIGH (ref 0.1–0.3)
INR BLD: 0.99 RATIO — SIGNIFICANT CHANGE UP (ref 0.65–1.3)
KETONES UR-MCNC: NEGATIVE — SIGNIFICANT CHANGE UP
LACTATE BLDV-MCNC: 0.9 MMOL/L — SIGNIFICANT CHANGE UP (ref 0.5–1.6)
LEUKOCYTE ESTERASE UR-ACNC: NEGATIVE — SIGNIFICANT CHANGE UP
LYMPHOCYTES # BLD AUTO: 2.94 K/UL — SIGNIFICANT CHANGE UP (ref 1.2–3.4)
LYMPHOCYTES # BLD AUTO: 34.5 % — SIGNIFICANT CHANGE UP (ref 20.5–51.1)
MAGNESIUM SERPL-MCNC: 2.1 MG/DL — SIGNIFICANT CHANGE UP (ref 1.8–2.4)
MCHC RBC-ENTMCNC: 30.1 PG — SIGNIFICANT CHANGE UP (ref 27–31)
MCHC RBC-ENTMCNC: 34.3 G/DL — SIGNIFICANT CHANGE UP (ref 32–37)
MCV RBC AUTO: 87.6 FL — SIGNIFICANT CHANGE UP (ref 81–99)
MONOCYTES # BLD AUTO: 0.8 K/UL — HIGH (ref 0.1–0.6)
MONOCYTES NFR BLD AUTO: 9.4 % — HIGH (ref 1.7–9.3)
NEUTROPHILS # BLD AUTO: 4.52 K/UL — SIGNIFICANT CHANGE UP (ref 1.4–6.5)
NEUTROPHILS NFR BLD AUTO: 53.1 % — SIGNIFICANT CHANGE UP (ref 42.2–75.2)
NITRITE UR-MCNC: NEGATIVE — SIGNIFICANT CHANGE UP
NRBC # BLD: 0 /100 WBCS — SIGNIFICANT CHANGE UP (ref 0–0)
NT-PROBNP SERPL-SCNC: 38 PG/ML — SIGNIFICANT CHANGE UP (ref 0–300)
PCO2 BLDV: 45 MMHG — SIGNIFICANT CHANGE UP (ref 41–51)
PH BLDV: 7.41 — SIGNIFICANT CHANGE UP (ref 7.26–7.43)
PH UR: 7 — SIGNIFICANT CHANGE UP (ref 5–8)
PLATELET # BLD AUTO: 274 K/UL — SIGNIFICANT CHANGE UP (ref 130–400)
PO2 BLDV: 19 MMHG — LOW (ref 20–40)
POTASSIUM BLDV-SCNC: 3.5 MMOL/L — SIGNIFICANT CHANGE UP (ref 3.3–5.6)
POTASSIUM SERPL-MCNC: 3.8 MMOL/L — SIGNIFICANT CHANGE UP (ref 3.5–5)
POTASSIUM SERPL-SCNC: 3.8 MMOL/L — SIGNIFICANT CHANGE UP (ref 3.5–5)
PROT SERPL-MCNC: 6.6 G/DL — SIGNIFICANT CHANGE UP (ref 6–8)
PROT UR-MCNC: NEGATIVE MG/DL — SIGNIFICANT CHANGE UP
PROTHROM AB SERPL-ACNC: 11.4 SEC — SIGNIFICANT CHANGE UP (ref 9.95–12.87)
RBC # BLD: 4.19 M/UL — LOW (ref 4.2–5.4)
RBC # FLD: 13.2 % — SIGNIFICANT CHANGE UP (ref 11.5–14.5)
SAO2 % BLDV: 28 % — SIGNIFICANT CHANGE UP
SODIUM SERPL-SCNC: 138 MMOL/L — SIGNIFICANT CHANGE UP (ref 135–146)
SP GR SPEC: 1.01 — SIGNIFICANT CHANGE UP (ref 1.01–1.03)
TROPONIN T SERPL-MCNC: <0.01 NG/ML — SIGNIFICANT CHANGE UP
TROPONIN T SERPL-MCNC: <0.01 NG/ML — SIGNIFICANT CHANGE UP
UROBILINOGEN FLD QL: 0.2 MG/DL — SIGNIFICANT CHANGE UP (ref 0.2–0.2)
WBC # BLD: 8.51 K/UL — SIGNIFICANT CHANGE UP (ref 4.8–10.8)
WBC # FLD AUTO: 8.51 K/UL — SIGNIFICANT CHANGE UP (ref 4.8–10.8)
WBC UR QL: SIGNIFICANT CHANGE UP /HPF

## 2019-07-01 PROCEDURE — 99283 EMERGENCY DEPT VISIT LOW MDM: CPT

## 2019-07-01 PROCEDURE — 93970 EXTREMITY STUDY: CPT | Mod: 26

## 2019-07-01 PROCEDURE — 93010 ELECTROCARDIOGRAM REPORT: CPT

## 2019-07-01 PROCEDURE — 99218: CPT

## 2019-07-01 RX ORDER — SODIUM CHLORIDE 9 MG/ML
1000 INJECTION INTRAMUSCULAR; INTRAVENOUS; SUBCUTANEOUS ONCE
Refills: 0 | Status: COMPLETED | OUTPATIENT
Start: 2019-07-01 | End: 2019-07-01

## 2019-07-01 RX ORDER — METOCLOPRAMIDE HCL 10 MG
10 TABLET ORAL ONCE
Refills: 0 | Status: COMPLETED | OUTPATIENT
Start: 2019-07-01 | End: 2019-07-01

## 2019-07-01 RX ORDER — ONDANSETRON 8 MG/1
4 TABLET, FILM COATED ORAL ONCE
Refills: 0 | Status: COMPLETED | OUTPATIENT
Start: 2019-07-01 | End: 2019-07-01

## 2019-07-01 RX ORDER — ACETAMINOPHEN 500 MG
975 TABLET ORAL ONCE
Refills: 0 | Status: COMPLETED | OUTPATIENT
Start: 2019-07-01 | End: 2019-07-01

## 2019-07-01 RX ORDER — SODIUM CHLORIDE 9 MG/ML
1000 INJECTION, SOLUTION INTRAVENOUS ONCE
Refills: 0 | Status: COMPLETED | OUTPATIENT
Start: 2019-07-01 | End: 2019-07-01

## 2019-07-01 RX ADMIN — Medication 975 MILLIGRAM(S): at 17:07

## 2019-07-01 RX ADMIN — Medication 975 MILLIGRAM(S): at 17:37

## 2019-07-01 RX ADMIN — ONDANSETRON 4 MILLIGRAM(S): 8 TABLET, FILM COATED ORAL at 20:13

## 2019-07-01 RX ADMIN — SODIUM CHLORIDE 1000 MILLILITER(S): 9 INJECTION, SOLUTION INTRAVENOUS at 20:29

## 2019-07-01 RX ADMIN — SODIUM CHLORIDE 1000 MILLILITER(S): 9 INJECTION INTRAMUSCULAR; INTRAVENOUS; SUBCUTANEOUS at 17:16

## 2019-07-01 NOTE — ED PROVIDER NOTE - OBJECTIVE STATEMENT
The pt is a 29y Female with no significant PMH  @ 13wks is presenting to ED with dizziness x 1 day. Pt states she woke up this morning and felt dizzy/lightheaded and had 1 syncopal episode. She also endorses pleuritic chest pain, sob with exertion and throbbing HA that started after the syncopal episode. She also endorses lower abdominal cramping. She states she has hyperemesis and has had multiple episodes of vomiting, but none today. She denies lower extremity swelling, visual changes, urinary changes, f/c/d. LMP march. She follows with the women's health clinic.

## 2019-07-01 NOTE — ED PROVIDER NOTE - CLINICAL SUMMARY MEDICAL DECISION MAKING FREE TEXT BOX
pt feels much better after IVF/antiemetics, +FHR, OB consulted, ed work up unremarkable, ddimer neg, will dispo to EDOU for snycope pathway, continuous tele, echo in AM, repeat troponin/ekg

## 2019-07-01 NOTE — ED PROVIDER NOTE - PROGRESS NOTE DETAILS
prelim neg for DVT per tech  by bedside US. OBGYN consulted will come and evaluate pt. I was directly involved in the management of this patient. Case was discussed with PA Fellow
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
## 2019-07-01 NOTE — ED ADULT NURSE NOTE - NSIMPLEMENTINTERV_GEN_ALL_ED
Implemented All Universal Safety Interventions:  [place] to call system. Call bell, personal items and telephone within reach. Instruct patient to call for assistance. Room bathroom lighting operational. Non-slip footwear when patient is off stretcher. Physically safe environment: no spills, clutter or unnecessary equipment. Stretcher in lowest position, wheels locked, appropriate side rails in place.

## 2019-07-01 NOTE — ED CDU PROVIDER INITIAL DAY NOTE - ATTENDING CONTRIBUTION TO CARE
Seen with PA agree with above, lungs- clear, abdomen- soft and non tender, s12 no gallops or murmurs, full workup in progres will continue to re-evaluate

## 2019-07-01 NOTE — ED PROVIDER NOTE - PHYSICAL EXAMINATION
GEN: Alert & Oriented x 3, No acute distress. Calm, appropriate.  Eyes: PERRL. EOMI. No conjunctival injection. No scleral icterus. V  RESP: Lungs clear to auscult bilat. no wheezes, rhonchi or rales. No retractions. Equal air entry.  CARDIO: regular rate and rhythm, no murmurs, rubs or gallops. Normal S1, S2. Radial pulses 2+ bilaterally. No lower extremity edema.  ABD: Soft, Nondistended. No rebound tenderness/guarding. No organomegaly. No pulsatile mass. No tenderness with palpation x 4 quadrants. Gravid uterus below umbilicus.   MS: Full ROM of extremities.   SKIN: no rashes/lesions, no petechiae, no ecchymosis.  Neuro: A&O x3, CN II- XII intact, strength 5/5 throughout extremities, sensation intact to touch pain and vibratory sensation. Speech & mentation intact. No drooping of eye or mouth. Without dysarthria or aphasia. Finger to nose intact. Romberg Neg. Neg. nuchal rigidity. Coordinated gait.

## 2019-07-01 NOTE — ED PROVIDER NOTE - NS ED ROS FT
GEN: (-) fever, (-) chills, (+) malaise  HEENT: (-) vision changes, (+) HA, (-) sore throat, (-) ear pain, (-) epistaxis , (-) tinnitus   CV: (+) chest pain, (-) palpitations, (-) edema  PULM: (-) cough, (-) wheezing, (+) SOB, (-) orthopnea, (-) hemoptysis   GI: (+) lower abdominal cramping,(+) Nausea, (-) Vomiting, (-) Diarrhea  NEURO: (-) weakness, (-) paresthesias, (+) syncope, (-) seizure  : (-) dysuria, (-) frequency, (-) urgency, (-) hematuria  MS: (-) back pain, (-) joint pain, (-)myalgias, (-) swelling  SKIN: (-) rashes, (-) new lesions, (-) pruritus, (-) jaundice  HEME: (-) bleeding, (-) ecchymosis

## 2019-07-01 NOTE — ED PROVIDER NOTE - ATTENDING CONTRIBUTION TO CARE
29F PMH appendectomy, , currently 13 weeks preg, follows at [facility] cant remember OB name, next appt , p/w syncope. states she woke up at 4am to pee, while walking felt diaphoretic lightheaded nauseous, grad onset diffuse throbbing HA assoc w vision "blacked out" and  had episode of syncope in the bathroom.  told her it lasted 30 mins he heard the fall and brought her to bed. denies shaking, tongue biting, incontinence. denies cp, sob, palp prior to syncope. denies head injury or injury to abdomen. but does c/o sob and abd cramping now after syncope. no chest pain. still feels nauseous, states due to hyperemesis, vomiting >10 x per day nbnb entire pregnancy. states she must have hit her R shoulder during syncope but it feels "sore".  no fever, cough.  no tearing bp. no le edema, le pain, immobilization, hormones, hemoptysis. no blood thinners. no neck stiffness, ams. no numbness, weakness, tingling. no blurry vision, slurred speech, trouble walking. feels lightheaded upon standing. no vdc. no dysuria, freq, hematuria. no vaginal bleeding. reports feeling dehydrated.     on exam, AFVSS, well jules nad, ncat, eomi, perrla, DRY mm, lctab, rrr nl s1s2 no mrg, nontender chest wall, no crepitus, abd soft ntnd, aaox3, CN 2-12 intact, No nystagmus.  5/5 motor x 4 ext, SILT x 4 extremities, No facial droop or slurred speech. No pronator drift.  Normal rapid alternating movement and finger nose finger bilaterally. No midline C/T/L tenderness to palpation or step off. Normal gait, No ataxia. , no le edema or calf ttp, R shoulder nontender, no signs of trauma, FROM, 5/5 motor, silt, 2+ radial pulse    a/p; Syncope, SOB, HA, abd cramps, nausea, no CP. NV intact. appears dehydrated. ?vasovagal. r/o PE/dvt given SOB/ pregnancy and syncope. check h/h, electrolytes, r/o aryythmia, r/o acs, r/o uti. hydrate pt. check FHR, OBGYN consult, consider obs for syncope/echo r/o cardiomyopathy and check tele monitor overnight. H&P not consistent w ich, meningitis, venous thrombosis, sz, tia/cva, dissection, pna, esoph perf, tamponade, ptx. will do labs, ekg/trop, UA, FHR POCUS, ddimer, LE dopplers, hydrate, ob c/s, re-eval. 29F PMH appendectomy, , currently 13 weeks preg, follows at [facility] cant remember OB name, next appt , p/w syncope. states she woke up at 4am to pee, while walking felt diaphoretic lightheaded nauseous, grad onset diffuse throbbing HA assoc w vision "blacked out" and  had episode of syncope in the bathroom.  told her it lasted 30 mins he heard the fall and brought her to bed. denies shaking, tongue biting, incontinence. denies cp, sob, palp prior to syncope. denies head injury or injury to abdomen. but does c/o sob and abd cramping now after syncope. no chest pain. still feels nauseous, states due to hyperemesis, vomiting >10 x per day nbnb entire pregnancy. states she must have hit her R shoulder during syncope but it feels "sore".  no fever, cough.  no tearing bp. no le edema, le pain, immobilization, hormones, hemoptysis. no blood thinners. no neck stiffness, ams. no numbness, weakness, tingling. no blurry vision, slurred speech, trouble walking. feels lightheaded upon standing. no vdc. no dysuria, freq, hematuria. no vaginal bleeding. reports feeling dehydrated.     on exam, AFVSS, well jules nad, ncat, eomi, perrla, DRY mm, lctab, rrr nl s1s2 no mrg, nontender chest wall, no crepitus, abd soft ntnd, aaox3, CN 2-12 intact, No nystagmus.  5/5 motor x 4 ext, SILT x 4 extremities, No facial droop or slurred speech. No pronator drift.  Normal rapid alternating movement and finger nose finger bilaterally. No midline C/T/L tenderness to palpation or step off. Normal gait, No ataxia. , no le edema or calf ttp, R shoulder mild diffuse soft tissue tenderness but no bony tenderness, no signs of trauma, FROM, 5/5 motor, silt, 2+ radial pulse    a/p; Syncope, SOB, HA, abd cramps, nausea, no CP. NV intact. appears dehydrated. ?vasovagal. r/o PE/dvt given SOB/ pregnancy and syncope. check h/h, electrolytes, r/o aryythmia, r/o acs, r/o uti. hydrate pt. check FHR, OBGYN consult, consider obs for syncope/echo r/o cardiomyopathy and check tele monitor overnight. H&P not consistent w ich, meningitis, venous thrombosis, sz, tia/cva, dissection, pna, esoph perf, tamponade, ptx. will do labs, ekg/trop, UA, FHR POCUS, ddimer, LE dopplers, hydrate, ob c/s, re-eval.

## 2019-07-01 NOTE — CONSULT NOTE ADULT - SUBJECTIVE AND OBJECTIVE BOX
JAYDA TARANGO   29y   Female   [id number]    Chief Complaint: syncope    HPI: 28yo  at 13w4d GA by 1st trim aleksandero, with EL 20, S/P syncopal episode this AM- woke up; felt diaphoretic; and     MEDICATIONS  (STANDING):    MEDICATIONS  (PRN):      Allergies    contrast media (gadolinium-based) (Anaphylaxis)  metoclopramide (Other (Mild to Mod))  penicillins (Anaphylaxis)    Intolerances        PAST MEDICAL & SURGICAL HISTORY:  Miscarriage: 2018  Migraine  Acid reflux  Appendicitis  S/P appendectomy      OB/GYN HISTORY:   LMP:            Cycle Length:              Days Flow:                                       Gyn: denies history of abnormal pap, STI, ovarian cysts, or uterine fibroids                                      Obstetric:  G  P                                        Last Pap smear:                                        Last mammogram:                                       Last Colonoscopy:    FAMILY HISTORY:  No pertinent family history in first degree relatives      SOCIAL HISTORY: Denies cigarette use, alcohol use, or illicit drug use    Vital Signs Last 24 Hrs  T(C): 36.7 (2019 15:30), Max: 36.7 (2019 15:30)  T(F): 98 (2019 15:30), Max: 98 (2019 15:30)  HR: 76 (2019 15:30) (76 - 76)  BP: 112/75 (2019 15:30) (112/75 - 112/75)  BP(mean): --  RR: 18 (2019 15:30) (18 - 18)  SpO2: 99% (2019 15:30) (99% - 99%)    Physical Exam:  Constitutional: AAOx3, NAD  Gastrointestinal: Soft, nontender, nondistended, no rebound, guarding, or rigidity  Pelvic:   external genitalia:   vagina:   uterus:   adnexae:   cervix:   Rectal:    LABS:                        12.6   8.51  )-----------( 274      ( 2019 17:20 )             36.7         07-    138  |  101  |  10  ----------------------------<  82  3.8   |  23  |  0.6<L>    Ca    9.4      2019 17:20  Mg     2.1     07-    TPro  6.6  /  Alb  3.9  /  TBili  0.4  /  DBili  x   /  AST  17  /  ALT  11  /  AlkPhos  50  07-01    PT/INR - ( 2019 17:20 )   PT: 11.40 sec;   INR: 0.99 ratio           Urinalysis Basic - ( 2019 19:10 )    Color: Yellow / Appearance: Cloudy / S.015 / pH: x  Gluc: x / Ketone: Negative  / Bili: Negative / Urobili: 0.2 mg/dL   Blood: x / Protein: Negative mg/dL / Nitrite: Negative   Leuk Esterase: Negative / RBC: x / WBC 3-5 /HPF   Sq Epi: x / Non Sq Epi: Many /HPF / Bacteria: Few /HPF          RADIOLOGY & ADDITIONAL STUDIES: JAYDA TARANGO   29y   Female   [id number]    Chief Complaint: syncope    HPI: 28yo  at 13w4d GA by 1st trim sono, with EL 20, S/P syncopal episode this AM- woke up; felt diaphoretic; experienced LOC for 30 min according to . Woke up feeling disoriented/confused. Denies h/o similar episodes. S/P negative PE workup in ED. Denies CP/SOB/palpitations. C/o generalized body aches associated with lower abdominal cramping, admits to inadequate hydration today. Denies fever, chills, nausea/vomiting, diarrhea/constipation, dysuria, urgency, frequency. Denies vaginal bleeding, foul smelling vaginal discharge. Follows at [facility]- so far has had uncomplicated pregnancy.     MEDICATIONS  (STANDING):  tylenol 1707   zofran 4mg @        Allergies    contrast media (gadolinium-based) (Anaphylaxis)  metoclopramide (Other (Mild to Mod))  penicillins (Anaphylaxis)      PAST MEDICAL & SURGICAL HISTORY:  Miscarriage: 2018  Migraine  Acid reflux  S/P open appendectomy      OB/GYN HISTORY:                                        Gyn: h/o conservatively managed ovarian cysts; h/o abnormal pap smears S/P normal colposcopy; denies history of STI, or uterine fibroids                                      Obstetric:  x2, SABx2, D&C x1                                         FAMILY HISTORY:  DM, HTN, heart disease       SOCIAL HISTORY: Denies cigarette use, alcohol use, or illicit drug use    Vital Signs Last 24 Hrs  T(C): 36.7 (2019 15:30), Max: 36.7 (2019 15:30)  T(F): 98 (2019 15:30), Max: 98 (2019 15:30)  HR: 76 (2019 15:30) (76 - 76)  BP: 112/75 (2019 15:30) (112/75 - 112/75)  RR: 18 (2019 15:30) (18 - 18)  SpO2: 99% (2019 15:30) (99% - 99%)    Physical Exam:  Constitutional: AAOx3, NAD  Gastrointestinal: Soft, nontender, nondistended, no rebound, guarding, or rigidity  Pelvic:   external genitalia: normal, no lesions  vagina: normal, no lesions  uterus: 12-14 w anteverted, nontender  adnexae: no palpable masses/tenderness  cervix: C/L/P, no active bleeding per os, cervix appears normal  Rectal: deferred     LABS:                        12.6   8.51  )-----------( 274      ( 2019 17:20 )             36.7         -    138  |  101  |  10  ----------------------------<  82  3.8   |  23  |  0.6<L>    Ca    9.4      2019 17:20  Mg     2.1         TPro  6.6  /  Alb  3.9  /  TBili  0.4  /  DBili  x   /  AST  17  /  ALT  11  /  AlkPhos  50      PT/INR - ( 2019 17:20 )   PT: 11.40 sec;   INR: 0.99 ratio           Urinalysis Basic - ( 2019 19:10 )    Color: Yellow / Appearance: Cloudy / S.015 / pH: x  Gluc: x / Ketone: Negative  / Bili: Negative / Urobili: 0.2 mg/dL   Blood: x / Protein: Negative mg/dL / Nitrite: Negative   Leuk Esterase: Negative / RBC: x / WBC 3-5 /HPF   Sq Epi: x / Non Sq Epi: Many /HPF / Bacteria: Few /HPF          RADIOLOGY & ADDITIONAL STUDIES:     BPM JAYDA TARANGO   29y   Female   [id number]    Chief Complaint: syncope    HPI: 28yo  at 13w4d GA by 1st trim sono, with EL 20, S/P syncopal episode this AM- woke up; felt diaphoretic; experienced LOC for 30 min according to . Denies head trauma/trauma to the belly. Woke up feeling disoriented/confused. Denies h/o similar episodes in the past. Denies h/o seizures. Per pt- had palpitations as a child, never had cardio workup. S/P negative PE workup in ED. Denies CP/SOB/palpitations. C/o generalized body aches associated with lower abdominal cramping, admits to inadequate hydration today. Denies fever, chills, nausea/vomiting, diarrhea/constipation, dysuria, urgency, frequency. Denies vaginal bleeding, foul smelling vaginal discharge. Follows at [facility]- so far has had uncomplicated pregnancy.     MEDICATIONS  (STANDING):  tylenol 1707   zofran 4mg @        Allergies    contrast media (gadolinium-based) (Anaphylaxis)  metoclopramide (Other (Mild to Mod))  penicillins (Anaphylaxis)      PAST MEDICAL & SURGICAL HISTORY:  Miscarriage: 2018  Migraine  Acid reflux  S/P open appendectomy      OB/GYN HISTORY:                                        Gyn: h/o conservatively managed ovarian cysts; h/o abnormal pap smears S/P normal colposcopy; denies history of STI, or uterine fibroids                                      Obstetric:  x2, SABx2, D&C x1                                         FAMILY HISTORY:  DM, HTN, heart disease       SOCIAL HISTORY: Denies cigarette use, alcohol use, or illicit drug use    Vital Signs Last 24 Hrs  T(C): 36.7 (2019 15:30), Max: 36.7 (2019 15:30)  T(F): 98 (2019 15:30), Max: 98 (2019 15:30)  HR: 76 (2019 15:30) (76 - 76)  BP: 112/75 (2019 15:30) (112/75 - 112/75)  RR: 18 (2019 15:30) (18 - 18)  SpO2: 99% (2019 15:30) (99% - 99%)    Physical Exam:  Constitutional: AAOx3, NAD  Gastrointestinal: Soft, nontender, nondistended, no rebound, guarding, or rigidity  Pelvic:   external genitalia: normal, no lesions  vagina: normal, no lesions  uterus: 12-14 w anteverted, nontender  adnexae: no palpable masses/tenderness  cervix: C/L/P, no active bleeding per os, cervix appears normal  Rectal: deferred     LABS:                        12.6   8.51  )-----------( 274      ( 2019 17:20 )             36.7         07-    138  |  101  |  10  ----------------------------<  82  3.8   |  23  |  0.6<L>    Ca    9.4      2019 17:20  Mg     2.1     -    TPro  6.6  /  Alb  3.9  /  TBili  0.4  /  DBili  x   /  AST  17  /  ALT  11  /  AlkPhos  50  07-    PT/INR - ( 2019 17:20 )   PT: 11.40 sec;   INR: 0.99 ratio           Urinalysis Basic - ( 2019 19:10 )    Color: Yellow / Appearance: Cloudy / S.015 / pH: x  Gluc: x / Ketone: Negative  / Bili: Negative / Urobili: 0.2 mg/dL   Blood: x / Protein: Negative mg/dL / Nitrite: Negative   Leuk Esterase: Negative / RBC: x / WBC 3-5 /HPF   Sq Epi: x / Non Sq Epi: Many /HPF / Bacteria: Few /HPF          RADIOLOGY & ADDITIONAL STUDIES:     BPM

## 2019-07-01 NOTE — ED CDU PROVIDER INITIAL DAY NOTE - OBJECTIVE STATEMENT
28y/o female with pmh of gestational dm, appendicitis, pt. is 13 weeks pregnant, pt. states she woke up at 4am today, she got up to urinate and had a syncopal episode in the bathroom. pt. states she does not think she hit her head. pt. c/o lower abdominal cramping and right shoulder pain. denies vaginal bleeding, cp, sob. + nausea and vomiting.

## 2019-07-01 NOTE — ED CDU PROVIDER INITIAL DAY NOTE - NEURO NEGATIVE STATEMENT, MLM
+ loss of consciousness, + syncope, + dizziness, no gait abnormality, +headache, no sensory deficits, and no weakness.

## 2019-07-02 VITALS
DIASTOLIC BLOOD PRESSURE: 57 MMHG | TEMPERATURE: 98 F | HEART RATE: 75 BPM | OXYGEN SATURATION: 98 % | RESPIRATION RATE: 16 BRPM | SYSTOLIC BLOOD PRESSURE: 99 MMHG

## 2019-07-02 PROCEDURE — 99217: CPT

## 2019-07-02 PROCEDURE — 93306 TTE W/DOPPLER COMPLETE: CPT | Mod: 26

## 2019-07-02 RX ORDER — SODIUM CHLORIDE 9 MG/ML
1000 INJECTION INTRAMUSCULAR; INTRAVENOUS; SUBCUTANEOUS ONCE
Refills: 0 | Status: COMPLETED | OUTPATIENT
Start: 2019-07-02 | End: 2019-07-02

## 2019-07-02 RX ADMIN — SODIUM CHLORIDE 1000 MILLILITER(S): 9 INJECTION INTRAMUSCULAR; INTRAVENOUS; SUBCUTANEOUS at 08:45

## 2019-07-02 NOTE — ED CDU PROVIDER DISPOSITION NOTE - PROVIDER TOKENS
FREE:[LAST:[YOUR PRIMARY CARE PHYSICIAN AND OB/GYN PHYSICIAN],PHONE:[(   )    -],FAX:[(   )    -],FOLLOWUP:[1-3 Days]]

## 2019-07-02 NOTE — ED CDU PROVIDER DISPOSITION NOTE - CLINICAL COURSE
Patient was sent to [facility] for further evaluation of a syncopal episode, has remained in no distress and with stable vitals, ekgs times two no ischemic chnages, enzymes negative, 2- d echo  normal, Patienyt is 13 weeks preganant , feeling better wants to go home, Copies of all blood work and other studies were given to patient and will fallow up with PAVITHRA TRIMBLE this week, , edvised plenty of fluids

## 2019-07-02 NOTE — ED ADULT NURSE REASSESSMENT NOTE - NS ED NURSE REASSESS COMMENT FT1
Patient assessed, resting comfortably in bed. No signs of acute distress noted. Patient awaiting transthoracic echocardiogram. Call bell in reach. Safety precautions maintained. Will continue to monitor.
Patient assessed, resting comfortably in bed. No signs of distress noted at this time. Patient voices no complaints. Call bell and personal items in reach. Patient family at bedside. Will continue to monitor.
pt resting comfortably, no c/o at this time. Will continue to monitor
Pt received in bed in stable condition, AOX4, no s/s of any distress noted. IV line in place, no signs of infiltration noted. Tolerating diet, no n/v. VS WNL. Call bell in reach, bed in lowest position. Safety maintained, hourly rounding performed. Taken to trans echo.
Patient received, alert and oriented x4. Patient verbalizes improvement of symptoms. Tolerating solids and liquids. Ongoing cardiac monitoring maintained. Normal sinus rhythm observed. Patient updated on the plan of care and verbalizes understanding. Safety and comfort measures maintained. Will continue to monitor.

## 2019-07-02 NOTE — ED CDU PROVIDER SUBSEQUENT DAY NOTE - PROGRESS NOTE DETAILS
Patient feeling well ; awaiting echo
pt. was clear by sanjuana katz wnl. pt. needs echo. will continue to reassess.

## 2019-07-02 NOTE — ED CDU PROVIDER SUBSEQUENT DAY NOTE - ATTENDING CONTRIBUTION TO CARE
Remains in no distress, feeling better full workup in progress
Remains comfortable and without any complaints, full workup in progress

## 2019-07-02 NOTE — ED CDU PROVIDER DISPOSITION NOTE - CARE PROVIDER_API CALL
YOUR PRIMARY CARE PHYSICIAN AND OB/GYN PHYSICIAN,   Phone: (   )    -  Fax: (   )    -  Follow Up Time: 1-3 Days

## 2019-07-02 NOTE — ED CDU PROVIDER DISPOSITION NOTE - NSFOLLOWUPINSTRUCTIONS_ED_ALL_ED_FT
Follow up with your primary care physician / OB.GYN.    Syncope    Syncope is when you temporarily lose consciousness, also called fainting or passing out. It is caused by a sudden decrease in blood flow to the brain. Even though most causes of syncope are not dangerous, syncope can be a sign of a serious medical problem. Signs that you may be about to faint include feeling dizzy, lightheaded, nauseas, visual changes, cold/clammy skin. Do not drive, use machinery, or play sports until your health care provider says it is okay.    SEEK IMMEDIATE MEDICAL CARE IF YOU HAVE THE FOLLOWING SYMPTOMS: severe headache, pain in your chest/abdomen/back, bleeding from your mouth or rectum, palpitations, shortness of breath, pain with breathing, seizure, confusion, trouble walking.

## 2019-07-03 LAB
CULTURE RESULTS: SIGNIFICANT CHANGE UP
SPECIMEN SOURCE: SIGNIFICANT CHANGE UP

## 2019-07-12 ENCOUNTER — APPOINTMENT (OUTPATIENT)
Dept: OBGYN | Facility: CLINIC | Age: 29
End: 2019-07-12

## 2019-07-22 ENCOUNTER — APPOINTMENT (OUTPATIENT)
Dept: OBGYN | Facility: CLINIC | Age: 29
End: 2019-07-22

## 2019-08-14 ENCOUNTER — APPOINTMENT (OUTPATIENT)
Dept: ANTEPARTUM | Facility: CLINIC | Age: 29
End: 2019-08-14

## 2021-02-10 NOTE — ED PROVIDER NOTE - ATTENDING CONTRIBUTION TO CARE
Universal Safety Interventions
diffuse abd pain for 1 month intermittent with constipation but for past 3 days intermittent vomiting, eating makes pain worse, feels bloating with urination. hx of surgeries in past including appendectomy. on exam she has minimal tenderness diffusely but soft without rebound or guaridng, no cva tenderness. rectal done by resident wihtout stool in vault. plan is ct scan to evaluate for SBO, check labs and reassess.

## 2021-12-06 NOTE — ED PROVIDER NOTE - EKG ADDITIONAL QUESTION - PERFORMED INDEPENDENT VISUALIZATION
Prednisone Pregnancy And Lactation Text: This medication is Pregnancy Category C and it isn't know if it is safe during pregnancy. This medication is excreted in breast milk. Yes
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
## 2024-02-12 NOTE — CONSULT NOTE ADULT - CONSULT REQUESTED DATE/TIME
PT eval completed- see note for details, goals and POC established.     Problem: Physical Therapy  Goal: Physical Therapy Goal  Description: Goals to be met by: 3/13/24     Patient will increase functional independence with mobility by performin. Supine to sit with Moderate Assistance  2. Sit to supine with Moderate Assistance  3. Bed to chair transfer with Moderate Assistance using Slideboard  5. Sitting at edge of bed x10 minutes with Minimal Assistance    Outcome: Ongoing, Progressing   2024     03-Jun-2019 16:16

## 2024-04-28 NOTE — ED PDOC
Arrival/HPI





- General


Chief Complaint: Flu-like Symptoms


Time Seen by Provider: 10/27/18 13:58


Historian: Patient





- History of Present Illness


Narrative History of Present Illness (Text): 





10/27/18 14:09


27 y/o female, no significant pmh, penicillin allergy, c/o cough/fatigue and 

bodyache x 2 days.  Pt. stated that she has dry cough, associated with fatigue 

and bodyache x 2 days, no fever, no recent traveling, no night sweat, no 

dizziness, no change in vision, no numbness or tingling, no rash, no other 

medical or psychological complaints. 





Past Medical History





- Provider Review


Nursing Documentation Reviewed: Yes





- Past History


Past History: No Previous





- Infectious Disease


Hx of Infectious Diseases: None





- Tetanus Immunization


Tetanus Immunization: Unknown





- Cardiac


Hx Cardiac Disorders: No





- Pulmonary


Hx Respiratory Disorders: Yes


Hx Asthma: Yes





- Neurological


Hx Neurological Disorder: No





- HEENT


Hx HEENT Disorder: No





- Renal


Hx Renal Disorder: No





- Endocrine/Metabolic


Hx Endocrine Disorders: No





- Hematological/Oncological


Hx Blood Disorders: No





- Integumentary


Hx Dermatological Disorder: No





- Musculoskeletal/Rheumatological


Hx Musculoskeletal Disorders: Yes


Hx Back Pain: Yes





- Gastrointestinal


Hx Gastrointestinal Disorders: Yes


Hx Gastroesophageal Reflux: Yes





- Genitourinary/Gynecological


Hx Genitourinary Disorders: Yes


Other/Comment: MISCARRIAGE 9/23





- Psychiatric


Hx Psychophysiologic Disorder: No


Hx Substance Use: Yes





- Surgical History


Hx Appendectomy: Yes





- Anesthesia


Hx Anesthesia: Yes


Hx Anesthesia Reactions: No


Hx Malignant Hyperthermia: No





- Suicidal Assessment


Feels Threatened In Home Enviroment: No





Family/Social History





- Physician Review


Nursing Documentation Reviewed: Yes


Family/Social History: Unknown Family HX


Smoking Status: Current Some Days Smoker


Hx Alcohol Use: Yes


Hx Substance Use: Yes


Substance used: marijuana





Allergies/Home Meds


Allergies/Adverse Reactions: 


Allergies





Penicillins Allergy (Verified 10/27/18 13:48)


   ANAPHYLAXIS











Review of Systems





- Review of Systems


Constitutional: Fatigue.  absent: Fevers


Eyes: absent: Vision Changes


ENT: absent: Hearing Changes


Respiratory: Cough.  absent: SOB, Sputum


Cardiovascular: absent: Chest Pain


Gastrointestinal: absent: Abdominal Pain, Nausea, Vomiting


Musculoskeletal: Myalgias.  absent: Arthralgias, Back Pain


Skin: absent: Rash, Pruritis


Neurological: absent: Headache, Dizziness


Psychiatric: absent: Anxiety, Depression, Suicidal Ideation





Physical Exam


Vital Signs Reviewed: Yes





Vital Signs











  Temp Pulse Resp BP Pulse Ox


 


 10/27/18 13:48  98.7 F  79  16  132/84  98











Temperature: Afebrile


Blood Pressure: Normal


Pulse: Regular


Respiratory Rate: Normal


Appearance: Positive for: Well-Appearing, Non-Toxic, Comfortable


Pain Distress: Mild


Mental Status: Positive for: Alert and Oriented X 3





- Systems Exam


Head: Present: Atraumatic, Normocephalic


Pupils: Present: PERRL


Extroacular Muscles: Present: EOMI


Conjunctiva: Present: Normal


Mouth: Present: Moist Mucous Membranes


Nose (External): Present: Atraumatic.  No: Abrasion, Contusion, Laceration, 

Lesions


Nose (Internal): Present: Normal Inspection, No Active Bleeding.  No: 

Rhinorrhea, Septal Hematoma, Epistaxis


Neck: Present: Normal Range of Motion, Trachea Midline.  No: Meningeal Signs, 

MIDLINE TENDERNESS, Paraspinal Tenderness, Lymphadenopathy


Respiratory/Chest: Present: Clear to Auscultation, Good Air Exchange.  No: 

Respiratory Distress, Accessory Muscle Use


Cardiovascular: Present: Regular Rate and Rhythm, Normal S1, S2.  No: Murmurs


Abdomen: No: Tenderness, Distention, Peritoneal Signs, Rebound, Guarding


Back: Present: Normal Inspection


Upper Extremity: Present: Normal Inspection, Normal ROM, Neurovascularly Intact,

Capillary Refill < 2s.  No: Cyanosis, Edema, Deformity


Lower Extremity: Present: Normal Inspection, Normal ROM, Capillary Refill < 2 s.

 No: Edema, Deformity


Neurological: Present: GCS=15, CN II-XII Intact, Speech Normal, Motor Func Gr

ossly Intact, Gait Normal, Memory Normal


Skin: Present: Warm, Dry, Normal Color.  No: Rashes


Psychiatric: Present: Alert, Oriented x 3, Normal Insight, Normal Concentration





Medical Decision Making


ED Course and Treatment: 





10/27/18 14:13


-Labs/rapid flu


-IVF/pepcid


-Tylenol


-Observe and reassess








10/27/18 16:39


-Urine hcg is negative


-CXR No active pulmonary disease.


-Labs show no acute findings except potassium 3.3 (potassium chloride 20meq po 

ordered).


-Rapid flu is negative, clinical suspicious is high


-Pt. feels much better after tylenol and fluid


-All labs/radiology results discussed with the patient. 


-Discharge home with tamiflu, tylenol, robitussin dm, stay hydrated, bed rest, 

follow up with your own pmd within 2 days, return to the ER for any new or 

worsening signs or symptoms. 





- RAD Interpretation


Radiology Orders: 











10/27/18 14:07


CHEST PORTABLE [RAD] Stat 








Date of service: 





10/27/2018





HISTORY:


 medical clearance 





COMPARISON:


11/01/2027 





FINDINGS:





LUNGS:


The lungs are well inflated and clear.





PLEURA:


No pleural effusions or pneumothorax. 





CARDIOVASCULAR:


The heart is normal in size.  No aortic atherosclerotic calcification present. 





OSSEOUS STRUCTURES:


Within normal limits for the patient's age.





VISUALIZED UPPER ABDOMEN:


Normal.





OTHER FINDINGS:


None.





IMPRESSION:


No active pulmonary disease.


: Radiologist





- Medication Orders


Current Medication Orders: 











Acetaminophen (Tylenol 325mg Tab)  650 mg PO STAT STA


   Stop: 10/27/18 14:06


Sodium Chloride (Sodium Chloride 0.9%)  1,000 mls @ 999 mls/hr IV .Q1H1M STA


   Stop: 10/27/18 15:05











- PA / NP / Resident Statement


MD/DO has reviewed & agrees with the documentation as recorded.





Disposition/Present on Arrival





- Present on Arrival


Any Indicators Present on Arrival: No


History of DVT/PE: No


History of Uncontrolled Diabetes: No


Urinary Catheter: No


History of Decub. Ulcer: No


History Surgical Site Infection Following: None





- Disposition


Have Diagnosis and Disposition been Completed?: Yes


Diagnosis: 


 Flu-like symptoms





Disposition: HOME/ ROUTINE


Disposition Time: 16:40


Patient Plan: Discharge


Condition: IMPROVED


Additional Instructions: 


-Discharge home with tamiflu, tylenol, robitussin dm, stay hydrated, bed rest, 

follow up with your own pmd within 2 days, return to the ER for any new or 

worsening signs or symptoms. 


Prescriptions: 


Acetaminophen [Tylenol 325mg tab] 2 tab PO QID PRN #30 tab


 PRN Reason: Other


guaiFENesin/Dextromethorphan [Q-Tussin Dm 10 MG/5 Ml-100 MG/5 Ml 120 Ml] 10 ml 

PO QID PRN #200 ml


 PRN Reason: Other


Oseltamivir Phosphate [Tamiflu] 75 mg PO BID #10 capsule


Referrals: 


[facility] [place] [Outside] - Follow up with primary


Forms:  [employer] (English), WORK NOTE Atrial fibrillation with RVR

## 2024-09-06 NOTE — ED PROVIDER NOTE - OBJECTIVE STATEMENT
Venaseal  Embolization is a procedure to block a blood vessel. It is done to stop blood flow to a vein. To do this, a thin, flexible tube, called a catheter,  is put into the specific blood vessel that needs to be blocked.  This is done after entering a peripheral vein in your leg. It is often done by a doctor called an interventional radiologist.   Why is venaseal done?  The procedure may be done to:  Stop blood flow to a specific vein    Discharge instructions    Venaseal  venaseal is a procedure to block a blood vessel. It is done to stop blood flow to a vein. To do this, a thin, flexible tube, called a catheter,  is put into the specific blood vessel that needs to be blocked.  This is done after entering a peripheral vein in your leg. It is often done by a doctor called an interventional radiologist.   Why is venaseal done?  The procedure may be done to:  Stop blood flow to a specific vein    Discharge instructions   Do not drive for 12 hours  There are no diet restrictions  The day of the procedure, we encourage you to return to normal daily activities with the following restrictions:    When sitting, keep your leg elevated for the first 24 hours  Do not cross your legs  No heavy lifting (nothing greater than what you can lift with one hand)  No aerobic activity for one week (no activity that would break a sweat)  The compression stocking is to be worn day and night for the next week, removing it only to shower.  The second week after the procedure, the stocking is worn during waking hours only.  When you resume your aerobic activity, the stockings should be worn for a total of one month during these activities and as needed after that.  You may shower beginning the morning after the procedure.  Remove all gauze dressings.  DO NOT remove any steri-strips.  Let them fall off naturally.  The remaining Steri-strips will be removed in the office at your next visit.  If at any time these strips cause a redness or  29yo F pmhx appendectomy report to have been complicated with "more than usual" abdominal incisions presents CC constipation x 1 month, diffuse abdominal pain associated w nausea and nbnb emesis. pt states that she has been taking miralax for the four days prior today and yesterday finally passed bowel movement but states it was just water. no fevers, cp, sob, dysuria. +abdominal bloating irritation to the surrounding skin, remove ALL of them immediately.  No tub baths until steri-strips are removed.  Bruising is normal.  You may feel a mild “pulling sensation” and/or leg discomfort after the procedure this is normal. This is the vein closing and shrinking over a period of time You may feel lumps and bumps over a period of time as your leg is healing.  This is normal and may take up to 6 months to resolve.  If bleeding were to occur (enough to saturate the dressing), elevate your leg and apply FIRM direct pressure to the site for 10 minutes.  Then apply ice to that same site for an additional 10 minutes.  Call the office with an update on the change of your condition.  If the office is closed use the after hours phone number (below).  Pain Medication:  Take Aleve 2 tablets every 12 hours (2x daily) for 7 days.  If you are taking ibuprofen,  take 3 tablets every 8 hours (3x daily) for 7 days.  Do not discontinue use even if there is no noted discomfort.  You may continue after one week as needed.   You may take extra-strength Tylenol.  Take 2 tablets every 6 hours, as needed for pain.  Do not exceed this amount in 24 hour period.    Make a follow-up appointment.  You can wait to set up this appointment until after both legs are done if that is the case.      For questions or concerns regarding your care and/or the procedure, please call Interventional Radiology at [phone], 7:30 am until 4:00 pm, Monday through Friday. or, after hours at [phone].  There is an Interventional Radiologist/Nurse Practitioner on call 24/7

## 2025-03-17 NOTE — ED ADULT NURSE NOTE - NS ED NURSE RECORD ANOTHER HT AND WT
RT knee pain x 3 weeks. [place]. She localizes the pain in the lateral and medial aspect of the RT knee. The pain can vary from sharp to dull. She notes some numbness and tingling in the RT lower extremity. She is taking Meloxicam with some relief. Denies any diabetic or smoking HX. No previous SX or FX to the RT knee. No PT or injection. Has undergone a MRI and would like to discuss those results. She is working at [employer] as a .    Yes